# Patient Record
Sex: MALE | URBAN - METROPOLITAN AREA
[De-identification: names, ages, dates, MRNs, and addresses within clinical notes are randomized per-mention and may not be internally consistent; named-entity substitution may affect disease eponyms.]

---

## 2020-01-24 ENCOUNTER — INPATIENT (INPATIENT)
Facility: HOSPITAL | Age: 50
LOS: 7 days | Discharge: HOME HEALTH | DRG: 519 | End: 2020-02-01
Attending: NEUROLOGICAL SURGERY | Admitting: NEUROLOGICAL SURGERY
Payer: COMMERCIAL

## 2020-01-24 VITALS
TEMPERATURE: 98 F | HEIGHT: 72 IN | HEART RATE: 90 BPM | SYSTOLIC BLOOD PRESSURE: 216 MMHG | OXYGEN SATURATION: 98 % | RESPIRATION RATE: 19 BRPM | DIASTOLIC BLOOD PRESSURE: 113 MMHG | WEIGHT: 315 LBS

## 2020-01-24 LAB
ALBUMIN SERPL ELPH-MCNC: 3.8 G/DL — SIGNIFICANT CHANGE UP (ref 3.3–5)
ALP SERPL-CCNC: 97 U/L — SIGNIFICANT CHANGE UP (ref 40–120)
ALT FLD-CCNC: 19 U/L — SIGNIFICANT CHANGE UP (ref 10–45)
ANION GAP SERPL CALC-SCNC: 12 MMOL/L — SIGNIFICANT CHANGE UP (ref 5–17)
APTT BLD: 32.9 SEC — SIGNIFICANT CHANGE UP (ref 27.5–36.3)
APTT BLD: 34.3 SEC — SIGNIFICANT CHANGE UP (ref 27.5–36.3)
AST SERPL-CCNC: 22 U/L — SIGNIFICANT CHANGE UP (ref 10–40)
BASOPHILS # BLD AUTO: 0.07 K/UL — SIGNIFICANT CHANGE UP (ref 0–0.2)
BASOPHILS NFR BLD AUTO: 0.6 % — SIGNIFICANT CHANGE UP (ref 0–2)
BILIRUB SERPL-MCNC: 0.3 MG/DL — SIGNIFICANT CHANGE UP (ref 0.2–1.2)
BLD GP AB SCN SERPL QL: NEGATIVE — SIGNIFICANT CHANGE UP
BLD GP AB SCN SERPL QL: NEGATIVE — SIGNIFICANT CHANGE UP
BUN SERPL-MCNC: 24 MG/DL — HIGH (ref 7–23)
CALCIUM SERPL-MCNC: 9 MG/DL — SIGNIFICANT CHANGE UP (ref 8.4–10.5)
CHLORIDE SERPL-SCNC: 101 MMOL/L — SIGNIFICANT CHANGE UP (ref 96–108)
CO2 SERPL-SCNC: 24 MMOL/L — SIGNIFICANT CHANGE UP (ref 22–31)
CREAT SERPL-MCNC: 1.45 MG/DL — HIGH (ref 0.5–1.3)
EOSINOPHIL # BLD AUTO: 0.43 K/UL — SIGNIFICANT CHANGE UP (ref 0–0.5)
EOSINOPHIL NFR BLD AUTO: 3.5 % — SIGNIFICANT CHANGE UP (ref 0–6)
GLUCOSE BLDC GLUCOMTR-MCNC: 154 MG/DL — HIGH (ref 70–99)
GLUCOSE SERPL-MCNC: 177 MG/DL — HIGH (ref 70–99)
HCT VFR BLD CALC: 38.2 % — LOW (ref 39–50)
HGB BLD-MCNC: 12.3 G/DL — LOW (ref 13–17)
IMM GRANULOCYTES NFR BLD AUTO: 0.5 % — SIGNIFICANT CHANGE UP (ref 0–1.5)
INR BLD: 1 — SIGNIFICANT CHANGE UP (ref 0.88–1.16)
INR BLD: 1.17 — HIGH (ref 0.88–1.16)
LYMPHOCYTES # BLD AUTO: 1.9 K/UL — SIGNIFICANT CHANGE UP (ref 1–3.3)
LYMPHOCYTES # BLD AUTO: 15.6 % — SIGNIFICANT CHANGE UP (ref 13–44)
MCHC RBC-ENTMCNC: 26.9 PG — LOW (ref 27–34)
MCHC RBC-ENTMCNC: 32.2 GM/DL — SIGNIFICANT CHANGE UP (ref 32–36)
MCV RBC AUTO: 83.4 FL — SIGNIFICANT CHANGE UP (ref 80–100)
MONOCYTES # BLD AUTO: 0.81 K/UL — SIGNIFICANT CHANGE UP (ref 0–0.9)
MONOCYTES NFR BLD AUTO: 6.7 % — SIGNIFICANT CHANGE UP (ref 2–14)
NEUTROPHILS # BLD AUTO: 8.89 K/UL — HIGH (ref 1.8–7.4)
NEUTROPHILS NFR BLD AUTO: 73.1 % — SIGNIFICANT CHANGE UP (ref 43–77)
NRBC # BLD: 0 /100 WBCS — SIGNIFICANT CHANGE UP (ref 0–0)
PLATELET # BLD AUTO: 221 K/UL — SIGNIFICANT CHANGE UP (ref 150–400)
POTASSIUM SERPL-MCNC: 3.7 MMOL/L — SIGNIFICANT CHANGE UP (ref 3.5–5.3)
POTASSIUM SERPL-SCNC: 3.7 MMOL/L — SIGNIFICANT CHANGE UP (ref 3.5–5.3)
PROT SERPL-MCNC: 7.2 G/DL — SIGNIFICANT CHANGE UP (ref 6–8.3)
PROTHROM AB SERPL-ACNC: 11.4 SEC — SIGNIFICANT CHANGE UP (ref 10–12.9)
PROTHROM AB SERPL-ACNC: 13.4 SEC — HIGH (ref 10–12.9)
RBC # BLD: 4.58 M/UL — SIGNIFICANT CHANGE UP (ref 4.2–5.8)
RBC # FLD: 14.1 % — SIGNIFICANT CHANGE UP (ref 10.3–14.5)
RH IG SCN BLD-IMP: NEGATIVE — SIGNIFICANT CHANGE UP
RH IG SCN BLD-IMP: NEGATIVE — SIGNIFICANT CHANGE UP
SODIUM SERPL-SCNC: 137 MMOL/L — SIGNIFICANT CHANGE UP (ref 135–145)
WBC # BLD: 12.16 K/UL — HIGH (ref 3.8–10.5)
WBC # FLD AUTO: 12.16 K/UL — HIGH (ref 3.8–10.5)

## 2020-01-24 PROCEDURE — 72146 MRI CHEST SPINE W/O DYE: CPT | Mod: 26

## 2020-01-24 PROCEDURE — 99282 EMERGENCY DEPT VISIT SF MDM: CPT

## 2020-01-24 PROCEDURE — 72141 MRI NECK SPINE W/O DYE: CPT | Mod: 26

## 2020-01-24 PROCEDURE — 99285 EMERGENCY DEPT VISIT HI MDM: CPT

## 2020-01-24 PROCEDURE — 99223 1ST HOSP IP/OBS HIGH 75: CPT | Mod: 57

## 2020-01-24 PROCEDURE — 72148 MRI LUMBAR SPINE W/O DYE: CPT | Mod: 26

## 2020-01-24 RX ORDER — ENOXAPARIN SODIUM 100 MG/ML
40 INJECTION SUBCUTANEOUS EVERY 12 HOURS
Refills: 0 | Status: DISCONTINUED | OUTPATIENT
Start: 2020-01-24 | End: 2020-01-25

## 2020-01-24 RX ORDER — DEXAMETHASONE 0.5 MG/5ML
4 ELIXIR ORAL EVERY 6 HOURS
Refills: 0 | Status: DISCONTINUED | OUTPATIENT
Start: 2020-01-24 | End: 2020-01-26

## 2020-01-24 RX ORDER — SENNA PLUS 8.6 MG/1
2 TABLET ORAL AT BEDTIME
Refills: 0 | Status: DISCONTINUED | OUTPATIENT
Start: 2020-01-24 | End: 2020-02-01

## 2020-01-24 RX ORDER — KETOROLAC TROMETHAMINE 30 MG/ML
30 SYRINGE (ML) INJECTION ONCE
Refills: 0 | Status: DISCONTINUED | OUTPATIENT
Start: 2020-01-24 | End: 2020-01-24

## 2020-01-24 RX ORDER — INSULIN LISPRO 100/ML
VIAL (ML) SUBCUTANEOUS
Refills: 0 | Status: DISCONTINUED | OUTPATIENT
Start: 2020-01-24 | End: 2020-02-01

## 2020-01-24 RX ORDER — DEXTROSE 50 % IN WATER 50 %
15 SYRINGE (ML) INTRAVENOUS ONCE
Refills: 0 | Status: DISCONTINUED | OUTPATIENT
Start: 2020-01-24 | End: 2020-02-01

## 2020-01-24 RX ORDER — SODIUM CHLORIDE 9 MG/ML
1000 INJECTION, SOLUTION INTRAVENOUS
Refills: 0 | Status: DISCONTINUED | OUTPATIENT
Start: 2020-01-24 | End: 2020-02-01

## 2020-01-24 RX ORDER — METOPROLOL TARTRATE 50 MG
5 TABLET ORAL ONCE
Refills: 0 | Status: COMPLETED | OUTPATIENT
Start: 2020-01-24 | End: 2020-01-24

## 2020-01-24 RX ORDER — ENOXAPARIN SODIUM 100 MG/ML
40 INJECTION SUBCUTANEOUS AT BEDTIME
Refills: 0 | Status: DISCONTINUED | OUTPATIENT
Start: 2020-01-24 | End: 2020-01-24

## 2020-01-24 RX ORDER — ACETAMINOPHEN 500 MG
650 TABLET ORAL EVERY 6 HOURS
Refills: 0 | Status: DISCONTINUED | OUTPATIENT
Start: 2020-01-24 | End: 2020-02-01

## 2020-01-24 RX ORDER — GLUCAGON INJECTION, SOLUTION 0.5 MG/.1ML
1 INJECTION, SOLUTION SUBCUTANEOUS ONCE
Refills: 0 | Status: DISCONTINUED | OUTPATIENT
Start: 2020-01-24 | End: 2020-02-01

## 2020-01-24 RX ORDER — OXYCODONE AND ACETAMINOPHEN 5; 325 MG/1; MG/1
1 TABLET ORAL ONCE
Refills: 0 | Status: DISCONTINUED | OUTPATIENT
Start: 2020-01-24 | End: 2020-01-24

## 2020-01-24 RX ORDER — AMLODIPINE BESYLATE 2.5 MG/1
10 TABLET ORAL ONCE
Refills: 0 | Status: COMPLETED | OUTPATIENT
Start: 2020-01-24 | End: 2020-01-24

## 2020-01-24 RX ORDER — PANTOPRAZOLE SODIUM 20 MG/1
40 TABLET, DELAYED RELEASE ORAL
Refills: 0 | Status: DISCONTINUED | OUTPATIENT
Start: 2020-01-24 | End: 2020-02-01

## 2020-01-24 RX ORDER — AMLODIPINE BESYLATE 2.5 MG/1
10 TABLET ORAL DAILY
Refills: 0 | Status: DISCONTINUED | OUTPATIENT
Start: 2020-01-24 | End: 2020-02-01

## 2020-01-24 RX ORDER — DEXTROSE 50 % IN WATER 50 %
12.5 SYRINGE (ML) INTRAVENOUS ONCE
Refills: 0 | Status: DISCONTINUED | OUTPATIENT
Start: 2020-01-24 | End: 2020-02-01

## 2020-01-24 RX ADMIN — OXYCODONE AND ACETAMINOPHEN 1 TABLET(S): 5; 325 TABLET ORAL at 16:47

## 2020-01-24 RX ADMIN — OXYCODONE AND ACETAMINOPHEN 1 TABLET(S): 5; 325 TABLET ORAL at 17:12

## 2020-01-24 RX ADMIN — Medication 2: at 23:06

## 2020-01-24 RX ADMIN — Medication 5 MILLIGRAM(S): at 23:22

## 2020-01-24 RX ADMIN — Medication 30 MILLIGRAM(S): at 16:47

## 2020-01-24 RX ADMIN — OXYCODONE AND ACETAMINOPHEN 1 TABLET(S): 5; 325 TABLET ORAL at 03:11

## 2020-01-24 RX ADMIN — Medication 30 MILLIGRAM(S): at 17:12

## 2020-01-24 RX ADMIN — Medication 4 MILLIGRAM(S): at 23:21

## 2020-01-24 RX ADMIN — AMLODIPINE BESYLATE 10 MILLIGRAM(S): 2.5 TABLET ORAL at 03:11

## 2020-01-24 RX ADMIN — AMLODIPINE BESYLATE 10 MILLIGRAM(S): 2.5 TABLET ORAL at 19:20

## 2020-01-24 NOTE — H&P ADULT - ATTENDING COMMENTS
Patient seen and examined 1/24/20. He presents to the ED after his legs gave out. He indicates that he has been having progressive lower extremity weakness for approximately 1 month. He has noticed numbness and paresthesias in the legs as well. No bowel/bladder issues. He indicates that he is unable to walk at this time. Antigravity in lower extremities but subjectively feels week per patient. Pitting lower extremity edema noted. Morbid obesity noted. MRI T-spine significant for T11-12 spinal canal stenosis with cord compression and signal from facet and ligament hypertrophy as well as disc disease; spinal cord signal change noted at that level. Plan for medical/cardiac clearance then T11-T12 laminectomy for spinal cord decompression. Risks including but not limited to infection, spinal destabilization, need for reoperation, weakness, paralysis, failure to improve neurologically, CSF leak discussed. Patient understands and wishes to move ahead with surgery. Will need Echo, LE doppler, CT T and L spine as part of preoperative workup. Start steroids.    Rodrick Mcgregor M.D.

## 2020-01-24 NOTE — ED ADULT NURSE REASSESSMENT NOTE - NS ED NURSE REASSESS COMMENT FT1
Patient received at change of shift resting in stretcher, no acute distress.  Respirations unlabored, non-diaphoretic, no pallor.  Patient brought for MRI, will monitor.

## 2020-01-24 NOTE — ED PROVIDER NOTE - ATTENDING CONTRIBUTION TO CARE
49M hx htn, c/o legs giving out. pt states has had lower back pain for past month. states tonight while a work his legs gave out on him and he fell to the ground. no incontinence. no saddle anesthesia. no trauma. no vomiting, no fevers.   gen- nad  heent- ncat, clear conj  cv -rrr  lungs -ctab  abd - soft, nt, nd  ext -wwp  neuro -aox3, castro, sensation intact, decreased strength b/l lower extremities  pt seen by neurosurgery, pending MRI to r/o cord process.

## 2020-01-24 NOTE — PATIENT PROFILE ADULT - DO YOU FEEL UNSAFE AT SCHOOL?
Patient's BP has dropped substantially, possibly aided by starting a low sodium diet. She is at 108/65 today in our visit, had an episode of <100/50 over the weekend, although she denies hypotensive symptoms. She stopped Amlodipine and cut her Carvedilol dose in half last Saturday per direction of on call MD. We may want to consider further dose reductions depending on where she levels out at. If she does need to continue BP medications she much prefers once daily formulations such as Amlodipine as they are easier to take. Let me know if you want to make any changes before I see her again next month. Thanks!  Brenden Blackwell, PharmD [facility] Pharmacist  Phone: [phone]  not applicable

## 2020-01-24 NOTE — H&P ADULT - NSHPPHYSICALEXAM_GEN_ALL_CORE
GEN: pt laying in bed, appears well, NAD  NEURO: AOx3. FC, OE spont, speech clear, face symmetric. CNII-XII intact, PERRL, EOMI. No drift. MAEx4. b/l UE 5/5 strength. b/l LE 3/5 strength. SILT.   CV: RRR +S1/S2  PULM: CTAB  GI: Abd obese, NT/ND  EXT: Ext warm, nontender. +pitting edema b/l.

## 2020-01-24 NOTE — ED ADULT NURSE REASSESSMENT NOTE - NS ED NURSE REASSESS COMMENT FT1
Patient a/oX 3, no new pain or symptom complaint, no new neuro deficits, vital signs stable.  MRI resulted;  Neurosurgery consult pending.

## 2020-01-24 NOTE — CONSULT NOTE ADULT - SUBJECTIVE AND OBJECTIVE BOX
Pt is 50 yo male PMH: HTN, BIBEMS to ED [facility] s/p both leg "giving out/buckling" yesterday, fall, no LOC.  Pt reports lower back pain x 1 month, radiating down b/l LE.  Pt denies acute bowel/bladder dysfunction, saddle paresthesia, foot drop, fevers/chills.    ICU Vital Signs Last 24 Hrs  T(C): 36.7 (24 Jan 2020 04:27), Max: 36.8 (24 Jan 2020 01:04)  T(F): 98.1 (24 Jan 2020 04:27), Max: 98.3 (24 Jan 2020 01:04)  HR: 87 (24 Jan 2020 04:27) (87 - 90)  BP: 211/101 (24 Jan 2020 04:27) (211/101 - 216/113)  BP(mean): --  ABP: --  ABP(mean): --  RR: 18 (24 Jan 2020 04:27) (18 - 19)  SpO2: 96% (24 Jan 2020 04:27) (96% - 98%)    Exam:  AA&OX3, NAD, clear coherent speech  CNs II-XII grossly intact  motor 5/5 b/l UE, negative Berg,   b/l LE 3-4/5, EHL b/l 5/5, negative for clonus,   hyporeflexic throughout  sensation to LT grossly intact throughout  rectal tone intact  no point tenderness to palpation throughout the entire spine    A/R  Pt is 50 yo male PMH: HTN, BIBEMS to ED [facility] s/p both leg "giving out/buckling" yesterday, fall, no LOC  - MRI C-T-L spine w/o contr  - Will follow up with finals recs upon completion of MRI of the spine  - D/w Dr. Marcano

## 2020-01-24 NOTE — H&P ADULT - NSHPREVIEWOFSYSTEMS_GEN_ALL_CORE
REVIEW OF SYSTEMS      General:	no recent illnesses, no recent wt gain/loss    Skin/Breast:  intact  	  Ophthalmologic:  negative, glasses for distance  	  ENMT:	negative    Respiratory and Thorax: no coughing, wheezing, recent URI  	  Cardiovascular: no chest pain, FERREIRA    Gastrointestinal:	soft, non tender    Genitourinary: no frequency, dysuria    Musculoskeletal:	negative    Neurological:	see HPI    Psychiatric:	negative    Hematology/Lymphatics:	negative    Endocrine:  	negative    Allergic/Immunologic:  Negative

## 2020-01-24 NOTE — H&P ADULT - NSHPLABSRESULTS_GEN_ALL_CORE
MRI total spine 1/24/20:    MR thoracicspine:    T11-12 cord compression and cord signal abnormality due to disc bulge with prominent ligamentum flavum hypertrophy.    T2-3 disc bulge with ligamentum flavum hypertrophy resulting in moderate spinal canal stenosis. No cord signal abnormality.    Additional multilevel degenerative changes of thoracic spine resulting in spinal canal stenosis and neuroforaminal narrowing as described above.    No acute fracture.    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    No acute fracture.

## 2020-01-24 NOTE — ED PROVIDER NOTE - CLINICAL SUMMARY MEDICAL DECISION MAKING FREE TEXT BOX
48 yo m with BLE weakness; hx of low back pain but weakness out of proportion to pain even after tx of pain.  Plan: MRI, neurosurg consult.
Current some day smoker

## 2020-01-24 NOTE — ED PROVIDER NOTE - PHYSICAL EXAMINATION
CONSTITUTIONAL: WD,WN. NAD.    SKIN: Normal color and turgor. No rash.    HEAD: NC/AT.  EYES: Conjunctiva clear. EOMI. PERRL.    ENT: Airway patent, OP without erythema, tonsillar swelling or exudate; uvula midline without swelling. Nasal mucosa clear, no rhinorrhea.   RESPIRATORY:  Breathing non-labored. No retractions or accessory muscle use.  Lungs CTA bilat.  CARDIOVASCULAR:  RRR, S1S2. No M/R/G.      GI:  Abdomen soft, nontender.    MSK: Neck supple with painless ROM.  No lower extremity edema or calf tenderness.  No joint swelling or ROM limitation.  NEURO: Alert and oriented; CN II-XII grossly intact. Speech clear. 3/5 hip flexion, 5/5 ankle & EHL dorsiflexion, 5/5 plantarflexion. Babinsky equivocal.  Decr sensation to light touch and poor proprioception bilat.  Decr pinprick lower legs.  Trace patellar DTR bilat. Able to stand with difficulty but unable to walk. Perianal sensation and rectal tone intact.

## 2020-01-24 NOTE — ED PROVIDER NOTE - OBJECTIVE STATEMENT
48 yo m PMHx HTN  lumbar pain/spasm for past month; yesterday fell twice at work due to legs buckling; reports paresthesia in lower legs but no saddle anesthesia, no upper ext weaknss/paresthesia, bladder or bowel dysfunction.

## 2020-01-24 NOTE — H&P ADULT - HISTORY OF PRESENT ILLNESS
Pt is a 48 y/o M PMH HTN presents after fall and leg weakness x 1 day. Pt has had intermittent leg weakness for the past few weeks, but yesterday his knees "buckled" causing him to fall. The same thing happened to him again when he tried to stand up. He reports feeling "muscle spasm" in low back and intermittent numbness in b/l LEs. Pt denies fever, HA, vision changes, paresthesias, N/V, urinary/bowel incontinence, saddle anesthesia. Pt takes norvasc for HTN.

## 2020-01-24 NOTE — H&P ADULT - ASSESSMENT
Pt is a 48 y/o M PMH HTN presented after fall and LBP, found to have T11-12, T2-3 disc bulge with cord compression on MRI    Plan:  - Admit to regional  - pre op for Lumbar decompression on 1/26/20 with Dr. Mcgregor  - neuro/vitals checks  - pain control  - pre op workup (echo, EKG, CXR, T/S, coags)  - Needs Dr. Blood clearance  - dopplers ordered  - cont norvasc 10  - decadron 4q6    Above plan d/w Dr. Mcgregor

## 2020-01-24 NOTE — ED ADULT NURSE NOTE - OBJECTIVE STATEMENT
50 y/o female received into the ED, 50 y/o female received into the ED, complaining of weakness and swelling to both lower legs.  Pt. states that he fell twice yesterday and hurt his knees.  Both of his lower legs appear swollen, red in color and painful to touch.  Pt. denies recent travel or injuries to the legs.

## 2020-01-24 NOTE — ED ADULT NURSE REASSESSMENT NOTE - NS ED NURSE REASSESS COMMENT FT1
Patient care rececived from previous RN. Patient a/oX 3, c/o of bilateral lower leg weakness , no pain or other neuro deficits.  Vital signs stable.  MRI results pelnding.  FAll precaution observed.

## 2020-01-24 NOTE — ED PROVIDER NOTE - NS ED ROS FT
CONSTITUTIONAL: No fever, chills, or weakness  NEURO: No headache, no dizziness, no syncope;   EYES: No visual changes  ENT: No rhinorrhea or sore throat  PULM: No cough or dyspnea  CV: No chest pain or palpitations  GI: No abdominal pain, vomiting, or diarrhea  : No dysuria, hematuria, frequency  MSK: No neck pain or no joint pain  SKIN: no rash or unusual bruising

## 2020-01-24 NOTE — ED PROVIDER NOTE - PROGRESS NOTE DETAILS
Radiology has tried reaching the on-call MRI tech but no answer; she left message and will continue to try to contact them.  Neurosurg PA evaluating patient. Ree: pt received from Dr. Borges and SUZE Batista at s/o; pt with low back pain, leg weakness and numbness. Seen by nsg and awaiting MR c-t-l spine. Will continue to monitor. jose: pt received at sign out from margot masters/dr garcía - pt in ed for 7 hrs pending full spine mri for le weakness, nsg was consulted by overnight team nsg will admit to reg bed

## 2020-01-24 NOTE — ED ADULT NURSE REASSESSMENT NOTE - NS ED NURSE REASSESS COMMENT FT1
Bilateral leg pain and weakness improved s/p Toradol, Percocet, no new symptom complaint.  Vital signs stable.  For admit;  transfer to ED holding 11 pending room assignment.

## 2020-01-25 DIAGNOSIS — I10 ESSENTIAL (PRIMARY) HYPERTENSION: ICD-10-CM

## 2020-01-25 DIAGNOSIS — R29.898 OTHER SYMPTOMS AND SIGNS INVOLVING THE MUSCULOSKELETAL SYSTEM: ICD-10-CM

## 2020-01-25 DIAGNOSIS — Z01.818 ENCOUNTER FOR OTHER PREPROCEDURAL EXAMINATION: ICD-10-CM

## 2020-01-25 LAB
ANION GAP SERPL CALC-SCNC: 12 MMOL/L — SIGNIFICANT CHANGE UP (ref 5–17)
BASOPHILS # BLD AUTO: 0.06 K/UL — SIGNIFICANT CHANGE UP (ref 0–0.2)
BASOPHILS NFR BLD AUTO: 0.5 % — SIGNIFICANT CHANGE UP (ref 0–2)
CHLORIDE SERPL-SCNC: 100 MMOL/L — SIGNIFICANT CHANGE UP (ref 96–108)
CO2 SERPL-SCNC: 23 MMOL/L — SIGNIFICANT CHANGE UP (ref 22–31)
EOSINOPHIL # BLD AUTO: 0.01 K/UL — SIGNIFICANT CHANGE UP (ref 0–0.5)
EOSINOPHIL NFR BLD AUTO: 0.1 % — SIGNIFICANT CHANGE UP (ref 0–6)
GLUCOSE BLDC GLUCOMTR-MCNC: 170 MG/DL — HIGH (ref 70–99)
GLUCOSE BLDC GLUCOMTR-MCNC: 191 MG/DL — HIGH (ref 70–99)
GLUCOSE BLDC GLUCOMTR-MCNC: 202 MG/DL — HIGH (ref 70–99)
HBA1C BLD-MCNC: 7.7 % — HIGH (ref 4–5.6)
HCT VFR BLD CALC: 41.1 % — SIGNIFICANT CHANGE UP (ref 39–50)
HGB BLD-MCNC: 12.9 G/DL — LOW (ref 13–17)
IMM GRANULOCYTES NFR BLD AUTO: 0.3 % — SIGNIFICANT CHANGE UP (ref 0–1.5)
LYMPHOCYTES # BLD AUTO: 0.94 K/UL — LOW (ref 1–3.3)
LYMPHOCYTES # BLD AUTO: 8.1 % — LOW (ref 13–44)
MAGNESIUM SERPL-MCNC: 1.9 MG/DL — SIGNIFICANT CHANGE UP (ref 1.6–2.6)
MCHC RBC-ENTMCNC: 26.4 PG — LOW (ref 27–34)
MCHC RBC-ENTMCNC: 31.4 GM/DL — LOW (ref 32–36)
MCV RBC AUTO: 84.2 FL — SIGNIFICANT CHANGE UP (ref 80–100)
MONOCYTES # BLD AUTO: 0.21 K/UL — SIGNIFICANT CHANGE UP (ref 0–0.9)
MONOCYTES NFR BLD AUTO: 1.8 % — LOW (ref 2–14)
NEUTROPHILS # BLD AUTO: 10.36 K/UL — HIGH (ref 1.8–7.4)
NEUTROPHILS NFR BLD AUTO: 89.2 % — HIGH (ref 43–77)
NRBC # BLD: 0 /100 WBCS — SIGNIFICANT CHANGE UP (ref 0–0)
PLATELET # BLD AUTO: 231 K/UL — SIGNIFICANT CHANGE UP (ref 150–400)
POTASSIUM SERPL-MCNC: 4.1 MMOL/L — SIGNIFICANT CHANGE UP (ref 3.5–5.3)
POTASSIUM SERPL-SCNC: 4.1 MMOL/L — SIGNIFICANT CHANGE UP (ref 3.5–5.3)
RBC # BLD: 4.88 M/UL — SIGNIFICANT CHANGE UP (ref 4.2–5.8)
RBC # FLD: 13.8 % — SIGNIFICANT CHANGE UP (ref 10.3–14.5)
SODIUM SERPL-SCNC: 135 MMOL/L — SIGNIFICANT CHANGE UP (ref 135–145)
WBC # BLD: 11.62 K/UL — HIGH (ref 3.8–10.5)
WBC # FLD AUTO: 11.62 K/UL — HIGH (ref 3.8–10.5)

## 2020-01-25 PROCEDURE — 72128 CT CHEST SPINE W/O DYE: CPT | Mod: 26

## 2020-01-25 PROCEDURE — 71045 X-RAY EXAM CHEST 1 VIEW: CPT | Mod: 26

## 2020-01-25 PROCEDURE — 93306 TTE W/DOPPLER COMPLETE: CPT | Mod: 26

## 2020-01-25 PROCEDURE — 72131 CT LUMBAR SPINE W/O DYE: CPT | Mod: 26

## 2020-01-25 PROCEDURE — 99221 1ST HOSP IP/OBS SF/LOW 40: CPT

## 2020-01-25 RX ORDER — SODIUM CHLORIDE 9 MG/ML
1000 INJECTION INTRAMUSCULAR; INTRAVENOUS; SUBCUTANEOUS
Refills: 0 | Status: DISCONTINUED | OUTPATIENT
Start: 2020-01-25 | End: 2020-01-25

## 2020-01-25 RX ORDER — SODIUM CHLORIDE 9 MG/ML
1000 INJECTION INTRAMUSCULAR; INTRAVENOUS; SUBCUTANEOUS
Refills: 0 | Status: DISCONTINUED | OUTPATIENT
Start: 2020-01-25 | End: 2020-01-26

## 2020-01-25 RX ORDER — POVIDONE-IODINE 5 %
1 AEROSOL (ML) TOPICAL ONCE
Refills: 0 | Status: COMPLETED | OUTPATIENT
Start: 2020-01-26 | End: 2020-01-26

## 2020-01-25 RX ADMIN — PANTOPRAZOLE SODIUM 40 MILLIGRAM(S): 20 TABLET, DELAYED RELEASE ORAL at 06:20

## 2020-01-25 RX ADMIN — AMLODIPINE BESYLATE 10 MILLIGRAM(S): 2.5 TABLET ORAL at 06:20

## 2020-01-25 RX ADMIN — Medication 4 MILLIGRAM(S): at 06:20

## 2020-01-25 RX ADMIN — Medication 2: at 07:26

## 2020-01-25 RX ADMIN — ENOXAPARIN SODIUM 40 MILLIGRAM(S): 100 INJECTION SUBCUTANEOUS at 19:40

## 2020-01-25 RX ADMIN — Medication 4 MILLIGRAM(S): at 19:40

## 2020-01-25 RX ADMIN — ENOXAPARIN SODIUM 40 MILLIGRAM(S): 100 INJECTION SUBCUTANEOUS at 06:20

## 2020-01-25 RX ADMIN — Medication 2: at 22:27

## 2020-01-25 RX ADMIN — Medication 4: at 18:04

## 2020-01-25 RX ADMIN — Medication 4 MILLIGRAM(S): at 23:27

## 2020-01-25 RX ADMIN — Medication 4 MILLIGRAM(S): at 15:07

## 2020-01-25 NOTE — PROGRESS NOTE ADULT - SUBJECTIVE AND OBJECTIVE BOX
HPI:  Pt is a 48 y/o M PMH HTN presents after fall and leg weakness x 1 day. Pt has had intermittent leg weakness for the past few weeks, but yesterday his knees "buckled" causing him to fall. The same thing happened to him again when he tried to stand up. He reports feeling "muscle spasm" in low back and intermittent numbness in b/l LEs. Pt denies fever, HA, vision changes, paresthesias, N/V, urinary/bowel incontinence, saddle anesthesia. Pt takes norvasc for HTN. (24 Jan 2020 19:16)  HOSPITAL COURSE:  1/24: admitted from ED with thoracic cord compression.  1/25: Ct scan performed, NPO for Echocardiogram.   OVERNIGHT EVENTS: no major events overnight  Vital Signs Last 24 Hrs  T(C): 36.9 (24 Jan 2020 21:29), Max: 37.2 (24 Jan 2020 19:23)  T(F): 98.5 (24 Jan 2020 21:29), Max: 98.9 (24 Jan 2020 19:23)  HR: 82 (25 Jan 2020 00:30) (78 - 96)  BP: 159/84 (25 Jan 2020 00:30) (159/84 - 211/101)  BP(mean): --  RR: 20 (25 Jan 2020 00:30) (17 - 20)  SpO2: 97% (25 Jan 2020 00:30) (95% - 98%)    I&O's Summary      PHYSICAL EXAM:    GEN: pt laying in bed, appears well, NAD  	NEURO: AOx3. FC, OE spont, speech clear, face symmetric. CNII-XII intact, PERRL, EOMI. No drift. MAEx4. b/l UE 5/5 strength. b/l LE 3/5 strength. SILT.   	CV: RRR +S1/S2  	PULM: CTAB  	GI: Abd obese, NT/ND  EXT: Ext warm, nontender. +pitting edema b/l      DIET: regular  [] NPO  [] Mechanical  [] Tube feeds    LABS:                        12.3   12.16 )-----------( 221      ( 24 Jan 2020 05:33 )             38.2     01-24    137  |  101  |  24<H>  ----------------------------<  177<H>  3.7   |  24  |  1.45<H>    Ca    9.0      24 Jan 2020 05:33    TPro  7.2  /  Alb  3.8  /  TBili  0.3  /  DBili  x   /  AST  22  /  ALT  19  /  AlkPhos  97  01-24    PT/INR - ( 24 Jan 2020 20:28 )   PT: 13.4 sec;   INR: 1.17          PTT - ( 24 Jan 2020 20:28 )  PTT:32.9 sec        CAPILLARY BLOOD GLUCOSE      POCT Blood Glucose.: 154 mg/dL (24 Jan 2020 22:24)      Drug Levels: [] N/A    CSF Analysis: [] N/A      Allergies    No Known Allergies    Intolerances      MEDICATIONS:  Antibiotics:    Neuro:  acetaminophen   Tablet .. 650 milliGRAM(s) Oral every 6 hours PRN    Anticoagulation:  enoxaparin Injectable 40 milliGRAM(s) SubCutaneous every 12 hours    OTHER:  amLODIPine   Tablet 10 milliGRAM(s) Oral daily  dexAMETHasone     Tablet 4 milliGRAM(s) Oral every 6 hours  dextrose 40% Gel 15 Gram(s) Oral once PRN  dextrose 50% Injectable 12.5 Gram(s) IV Push once  glucagon  Injectable 1 milliGRAM(s) IntraMuscular once PRN  insulin lispro (HumaLOG) corrective regimen sliding scale   SubCutaneous Before meals and at bedtime  pantoprazole    Tablet 40 milliGRAM(s) Oral before breakfast  senna 2 Tablet(s) Oral at bedtime PRN    IVF:  dextrose 5%. 1000 milliLiter(s) IV Continuous <Continuous>    CULTURES:    RADIOLOGY & ADDITIONAL TESTS:      ASSESSMENT:  49y Male with PMH HTN presented after fall and LBP, found to have T11-12, T2-3 disc bulge with cord compression on MRI      LEG WEAKNESS BILATERAL  No pertinent family history in first degree relatives  Handoff  MEWS Score  HTN (hypertension)  Leg weakness, bilateral  No significant past surgical history  WEAKNESS      PLAN:   Medicine / cardiology clearance  -echo  -LE doppler  -Pre op for thoracic decompression on Sunday pr Dr. Mcgregor  dw with Dr. Mcgregor  DVT PROPHYLAXIS:  [x] Venodynes                                [x Heparin/Lovenox    DISPOSITION: pending    Assessment:  Present when checked    []  GCS  E   V  M     Heart Failure: []Acute, [] acute on chronic , []chronic  Heart Failure:  [] Diastolic (HFpEF), [] Systolic (HFrEF), []Combined (HFpEF and HFrEF), [] RHF, [] Pulm HTN, [] Other    [] [place], [] ATN, [] AIN, [] other  [] CKD1, [] CKD2, [] CKD 3, [] CKD 4, [] CKD 5, []ESRD    Encephalopathy: [] Metabolic, [] Hepatic, [] toxic, [] Neurological, [] Other    Abnormal Nurtitional Status: [] malnurtition (see nutrition note), [ ]underweight: BMI < 19, [] morbid obesity: BMI >40, [] Cachexia    [] Sepsis  [] hypovolemic shock,[] cardiogenic shock, [] hemorrhagic shock, [] neuogenic shock  [] Acute Respiratory Failure  []Cerebral edema, [] Brain compression/ herniation,   [] Functional quadriplegia  [] Acute blood loss anemia

## 2020-01-25 NOTE — CONSULT NOTE ADULT - SUBJECTIVE AND OBJECTIVE BOX
Patient is a 49y old  Male who presents with a chief complaint of Cord Compression (25 Jan 2020 03:39)      HPI:  Pt is a 48 y/o M PMH HTN presents after fall and leg weakness x 1 day. Pt has had intermittent leg weakness for the past few weeks, but yesterday his knees "buckled" causing him to fall. The same thing happened to him again when he tried to stand up. He reports feeling "muscle spasm" in low back and intermittent numbness in b/l LEs. Pt denies fever, HA, vision changes, paresthesias, N/V, urinary/bowel incontinence, saddle anesthesia. Pt takes norvasc for HTN. (24 Jan 2020 19:16)      PAST MEDICAL & SURGICAL HISTORY:  HTN (hypertension)  No significant past surgical history      FAMILY HISTORY:  No pertinent family history in first degree relatives      SOCIAL HISTORY:  Smoking Status: [ ] Current, [ ] Former, [ ] Never  Pack Years:    MEDICATIONS:  Pulmonary:    Antimicrobials:    Anticoagulants:  enoxaparin Injectable 40 milliGRAM(s) SubCutaneous every 12 hours    Onc:    GI/:  pantoprazole    Tablet 40 milliGRAM(s) Oral before breakfast  senna 2 Tablet(s) Oral at bedtime PRN    Endocrine:  dexAMETHasone     Tablet 4 milliGRAM(s) Oral every 6 hours  dextrose 40% Gel 15 Gram(s) Oral once PRN  dextrose 50% Injectable 12.5 Gram(s) IV Push once  glucagon  Injectable 1 milliGRAM(s) IntraMuscular once PRN  insulin lispro (HumaLOG) corrective regimen sliding scale   SubCutaneous Before meals and at bedtime    Cardiac:  amLODIPine   Tablet 10 milliGRAM(s) Oral daily    Other Medications:  acetaminophen   Tablet .. 650 milliGRAM(s) Oral every 6 hours PRN  dextrose 5%. 1000 milliLiter(s) IV Continuous <Continuous>  sodium chloride 0.9%. 1000 milliLiter(s) IV Continuous <Continuous>      Allergies    No Known Allergies    Intolerances        Review of Systems:   •	General: negative  •	Skin/Breast: negative  •	Ophthalmologic: negative  •	ENMT: negative  •	Respiratory and Thorax: negative  •	Cardiovascular: negative  •	Gastrointestinal: negative  •	Genitourinary: negative  •	Musculoskeletal: negative  •	Neurological: negative  •	Psychiatric: negative  •	Hematology/Lymphatics: negative  •	Endocrine: negative  •	Allergic/Immunologic: negative    Physical Exam:   • Constitutional:	Well-developed, well nourished  • Eyes:	EOMI; PERRL; no drainage or redness  • ENMT:	No oral lesions; no gross abnormalities  • Neck	No bruits; no thyromegaly or nodules  • Breasts:	not examined  • Back:	No deformity or limitation of movement  • Respiratory:	Breath Sounds equal & clear to percussion & auscultation, no accessory muscle use  • Cardiovascular:	Regular rate & rhythm, normal S1, S2; no murmurs, gallops or rubs; no S3, S4  • Gastrointestinal:	Soft, non-tender, no hepatosplenomegaly, normal bowel sounds  • Genitourinary:	not examined  • Rectal: not examined  • Extremities:	No cyanosis, clubbing or edema  • Vascular:	Equal and normal pulses (carotid, femoral, dorsalis pedis)  • Neurologica:l	not examined  • Skin:	No lesions; no rash  • Lymph Nodes:	No lymphadedenopathy  • Musculoskeletal:	No joint pain, swelling or deformity; no limitation of movement      Vital Signs Last 24 Hrs  T(C): 37 (25 Jan 2020 05:32), Max: 37.2 (24 Jan 2020 19:23)  T(F): 98.6 (25 Jan 2020 05:32), Max: 98.9 (24 Jan 2020 19:23)  HR: 92 (25 Jan 2020 07:20) (78 - 96)  BP: 170/83 (25 Jan 2020 07:20) (159/84 - 190/107)  BP(mean): --  RR: 19 (25 Jan 2020 07:20) (17 - 20)  SpO2: 96% (25 Jan 2020 07:20) (96% - 98%)    01-24 @ 07:01  -  01-25 @ 07:00  --------------------------------------------------------  IN: 0 mL / OUT: 500 mL / NET: -500 mL          LABS:      CBC Full  -  ( 25 Jan 2020 06:25 )  WBC Count : 11.62 K/uL  RBC Count : 4.88 M/uL  Hemoglobin : 12.9 g/dL  Hematocrit : 41.1 %  Platelet Count - Automated : 231 K/uL  Mean Cell Volume : 84.2 fl  Mean Cell Hemoglobin : 26.4 pg  Mean Cell Hemoglobin Concentration : 31.4 gm/dL  Auto Neutrophil # : 10.36 K/uL  Auto Lymphocyte # : 0.94 K/uL  Auto Monocyte # : 0.21 K/uL  Auto Eosinophil # : 0.01 K/uL  Auto Basophil # : 0.06 K/uL  Auto Neutrophil % : 89.2 %  Auto Lymphocyte % : 8.1 %  Auto Monocyte % : 1.8 %  Auto Eosinophil % : 0.1 %  Auto Basophil % : 0.5 %    01-25    135  |  100  |  x   ----------------------------<  x   4.1   |  23  |  x     Ca    9.0      24 Jan 2020 05:33  Mg     1.9     01-25    TPro  7.2  /  Alb  3.8  /  TBili  0.3  /  DBili  x   /  AST  22  /  ALT  19  /  AlkPhos  97  01-24    PT/INR - ( 24 Jan 2020 20:28 )   PT: 13.4 sec;   INR: 1.17          PTT - ( 24 Jan 2020 20:28 )  PTT:32.9 sec                  RADIOLOGY & ADDITIONAL STUDIES (The following images were personally reviewed): Patient is a 49y old  Male who presents with a chief complaint of Cord Compression (2020 03:39)      HPI:  Pt is a 48 y/o M PMH HTN presents after fall and leg weakness x 1 day. Pt has had intermittent leg weakness for the past few weeks, but yesterday his knees "buckled" causing him to fall. The same thing happened to him again when he tried to stand up. He reports feeling "muscle spasm" in low back and intermittent numbness in b/l LEs. Pt denies fever, HA, vision changes, paresthesias, N/V, urinary/bowel incontinence, saddle anesthesia. Pt takes norvasc for HTN. (2020 19:16). Pt works as [employer] (formerly [employer])  for 30 years. Denies chest pain and shortness of breath with activities. No MI, CVA, DVT       PAST MEDICAL & SURGICAL HISTORY:  HTN (hypertension)  circumcision , general anesthesia, uncomplicated    FAMILY HISTORY:  father  45y/o HIV, IVDA  mother alive 76 y/o , DM2, HTN, hypothyroid      SOCIAL HISTORY:  Smoking Status: [ ] Current, [x ] Former, [ ] Never  Pack Years: half ppd for 5 years. stopped in 2020.  social ETOH, no illicit drugs    MEDICATIONS:  Pulmonary:    Antimicrobials:    Anticoagulants:  enoxaparin Injectable 40 milliGRAM(s) SubCutaneous every 12 hours    Onc:    GI/:  pantoprazole    Tablet 40 milliGRAM(s) Oral before breakfast  senna 2 Tablet(s) Oral at bedtime PRN    Endocrine:  dexAMETHasone     Tablet 4 milliGRAM(s) Oral every 6 hours  dextrose 40% Gel 15 Gram(s) Oral once PRN  dextrose 50% Injectable 12.5 Gram(s) IV Push once  glucagon  Injectable 1 milliGRAM(s) IntraMuscular once PRN  insulin lispro (HumaLOG) corrective regimen sliding scale   SubCutaneous Before meals and at bedtime    Cardiac:  amLODIPine   Tablet 10 milliGRAM(s) Oral daily    Other Medications:  acetaminophen   Tablet .. 650 milliGRAM(s) Oral every 6 hours PRN  dextrose 5%. 1000 milliLiter(s) IV Continuous <Continuous>  sodium chloride 0.9%. 1000 milliLiter(s) IV Continuous <Continuous>      Allergies    No Known Allergies    Intolerances        Review of Systems:   •	General: negative  •	Skin/Breast: negative  •	Ophthalmologic: negative  •	ENMT: negative  •	Respiratory and Thorax: negative  •	Cardiovascular: negative  •	Gastrointestinal: negative  •	Genitourinary: negative  •	Musculoskeletal: negative  •	Neurological: negative  •	Psychiatric: negative  •	Hematology/Lymphatics: negative  •	Endocrine: negative  •	Allergic/Immunologic: negative    Physical Exam:   • Constitutional:	Well-developed, well nourished  • Eyes:	EOMI; PERRL; no drainage or redness  • ENMT:	No oral lesions; no gross abnormalities  • Neck	No bruits; no thyromegaly or nodules  • Breasts:	not examined  • Back:	No deformity or limitation of movement  • Respiratory:	Breath Sounds equal & clear to auscultation, no accessory muscle use  • Cardiovascular:	Regular rate & rhythm, normal S1, S2; no murmurs, gallops or rubs; no S3, S4  • Gastrointestinal:	Soft, non-tender, no hepatosplenomegaly, normal bowel sounds  • Genitourinary:	not examined  • Rectal: not examined  • Extremities:	No cyanosis, clubbing or edema  • Vascular:	Equal and normal pulses (dorsalis pedis)  • Neurologica:l	not examined  • Skin:	No lesions; no rash  • Lymph Nodes:	No lymphadedenopathy  • Musculoskeletal:	No joint pain, swelling or deformity; no limitation of movement      Vital Signs Last 24 Hrs  T(C): 37 (2020 05:32), Max: 37.2 (2020 19:23)  T(F): 98.6 (2020 05:32), Max: 98.9 (2020 19:23)  HR: 92 (2020 07:20) (78 - 96)  BP: 170/83 (2020 07:20) (159/84 - 190/107)  BP(mean): --  RR: 19 (2020 07:20) (17 - 20)  SpO2: 96% (2020 07:20) (96% - 98%)     @ 07:01  -   @ 07:00  --------------------------------------------------------  IN: 0 mL / OUT: 500 mL / NET: -500 mL          LABS:      CBC Full  -  ( 2020 06:25 )  WBC Count : 11.62 K/uL  RBC Count : 4.88 M/uL  Hemoglobin : 12.9 g/dL  Hematocrit : 41.1 %  Platelet Count - Automated : 231 K/uL  Mean Cell Volume : 84.2 fl  Mean Cell Hemoglobin : 26.4 pg  Mean Cell Hemoglobin Concentration : 31.4 gm/dL  Auto Neutrophil # : 10.36 K/uL  Auto Lymphocyte # : 0.94 K/uL  Auto Monocyte # : 0.21 K/uL  Auto Eosinophil # : 0.01 K/uL  Auto Basophil # : 0.06 K/uL  Auto Neutrophil % : 89.2 %  Auto Lymphocyte % : 8.1 %  Auto Monocyte % : 1.8 %  Auto Eosinophil % : 0.1 %  Auto Basophil % : 0.5 %        135  |  100  |  x   ----------------------------<  x   4.1   |  23  |  x     Ca    9.0      2020 05:33  Mg     1.9         TPro  7.2  /  Alb  3.8  /  TBili  0.3  /  DBili  x   /  AST  22  /  ALT  19  /  AlkPhos  97  -24    PT/INR - ( 2020 20:28 )   PT: 13.4 sec;   INR: 1.17          PTT - ( 2020 20:28 )  PTT:32.9 sec                  RADIOLOGY & ADDITIONAL STUDIES (The following images were personally reviewed):  ECG 2020 in chart  SR 81 bpm, no ST elevation, incomplete RBBB Patient is a 49y old  Male who presents with a chief complaint of Cord Compression (2020 03:39)      HPI:  Pt is a 50 y/o M PMH HTN presents after fall and leg weakness x 1 day. Pt has had intermittent leg weakness for the past few weeks, but yesterday his knees "buckled" causing him to fall. The same thing happened to him again when he tried to stand up. He reports feeling "muscle spasm" in low back and intermittent numbness in b/l LEs. Pt denies fever, HA, vision changes, paresthesias, N/V, urinary/bowel incontinence, saddle anesthesia. Pt takes norvasc for HTN. (2020 19:16). Pt works as [email or web address]  for 30 years. Denies chest pain and shortness of breath with activities. No MI, CVA, DVT       PAST MEDICAL & SURGICAL HISTORY:  HTN (hypertension)  circumcision , general anesthesia, uncomplicated    FAMILY HISTORY:  father  43y/o HIV, IVDA  mother alive 76 y/o , DM2, HTN, hypothyroid      SOCIAL HISTORY:  Smoking Status: [ ] Current, [x ] Former, [ ] Never  Pack Years: half ppd for 5 years. stopped in 2020.  social ETOH, no illicit drugs    MEDICATIONS:  Pulmonary:    Antimicrobials:    Anticoagulants:  enoxaparin Injectable 40 milliGRAM(s) SubCutaneous every 12 hours    Onc:    GI/:  pantoprazole    Tablet 40 milliGRAM(s) Oral before breakfast  senna 2 Tablet(s) Oral at bedtime PRN    Endocrine:  dexAMETHasone     Tablet 4 milliGRAM(s) Oral every 6 hours  dextrose 40% Gel 15 Gram(s) Oral once PRN  dextrose 50% Injectable 12.5 Gram(s) IV Push once  glucagon  Injectable 1 milliGRAM(s) IntraMuscular once PRN  insulin lispro (HumaLOG) corrective regimen sliding scale   SubCutaneous Before meals and at bedtime    Cardiac:  amLODIPine   Tablet 10 milliGRAM(s) Oral daily    Other Medications:  acetaminophen   Tablet .. 650 milliGRAM(s) Oral every 6 hours PRN  dextrose 5%. 1000 milliLiter(s) IV Continuous <Continuous>  sodium chloride 0.9%. 1000 milliLiter(s) IV Continuous <Continuous>      Allergies    No Known Allergies    Intolerances        Review of Systems:   •	General: negative  •	Skin/Breast: negative  •	Ophthalmologic: negative  •	ENMT: negative  •	Respiratory and Thorax: negative  •	Cardiovascular: negative  •	Gastrointestinal: negative  •	Genitourinary: negative  •	Musculoskeletal: negative  •	Neurological: negative  •	Psychiatric: negative  •	Hematology/Lymphatics: negative  •	Endocrine: negative  •	Allergic/Immunologic: negative    Physical Exam:   • Constitutional:	Well-developed, well nourished  • Eyes:	EOMI; PERRL; no drainage or redness  • ENMT:	No oral lesions; no gross abnormalities  • Neck	No bruits; no thyromegaly or nodules  • Breasts:	not examined  • Back:	No deformity or limitation of movement  • Respiratory:	Breath Sounds equal & clear to auscultation, no accessory muscle use  • Cardiovascular:	Regular rate & rhythm, normal S1, S2; no murmurs, gallops or rubs; no S3, S4  • Gastrointestinal:	Soft, non-tender, no hepatosplenomegaly, normal bowel sounds  • Genitourinary:	not examined  • Rectal: not examined  • Extremities:	No cyanosis, clubbing or edema  • Vascular:	Equal and normal pulses (dorsalis pedis)  • Neurologica:l	not examined  • Skin:	No lesions; no rash  • Lymph Nodes:	No lymphadedenopathy  • Musculoskeletal:	No joint pain, swelling or deformity; no limitation of movement      Vital Signs Last 24 Hrs  T(C): 37 (2020 05:32), Max: 37.2 (2020 19:23)  T(F): 98.6 (2020 05:32), Max: 98.9 (2020 19:23)  HR: 92 (2020 07:20) (78 - 96)  BP: 170/83 (2020 07:20) (159/84 - 190/107)  BP(mean): --  RR: 19 (2020 07:20) (17 - 20)  SpO2: 96% (2020 07:20) (96% - 98%)     @ 07:01  -   @ 07:00  --------------------------------------------------------  IN: 0 mL / OUT: 500 mL / NET: -500 mL          LABS:      CBC Full  -  ( 2020 06:25 )  WBC Count : 11.62 K/uL  RBC Count : 4.88 M/uL  Hemoglobin : 12.9 g/dL  Hematocrit : 41.1 %  Platelet Count - Automated : 231 K/uL  Mean Cell Volume : 84.2 fl  Mean Cell Hemoglobin : 26.4 pg  Mean Cell Hemoglobin Concentration : 31.4 gm/dL  Auto Neutrophil # : 10.36 K/uL  Auto Lymphocyte # : 0.94 K/uL  Auto Monocyte # : 0.21 K/uL  Auto Eosinophil # : 0.01 K/uL  Auto Basophil # : 0.06 K/uL  Auto Neutrophil % : 89.2 %  Auto Lymphocyte % : 8.1 %  Auto Monocyte % : 1.8 %  Auto Eosinophil % : 0.1 %  Auto Basophil % : 0.5 %        135  |  100  |  x   ----------------------------<  x   4.1   |  23  |  x     Ca    9.0      2020 05:33  Mg     1.9         TPro  7.2  /  Alb  3.8  /  TBili  0.3  /  DBili  x   /  AST  22  /  ALT  19  /  AlkPhos  97  24    PT/INR - ( 2020 20:28 )   PT: 13.4 sec;   INR: 1.17          PTT - ( 2020 20:28 )  PTT:32.9 sec                  RADIOLOGY & ADDITIONAL STUDIES (The following images were personally reviewed):  ECG 2020 in chart  SR 81 bpm, no ST elevation, incomplete RBBB    Echo: 2020  EF 66 %  normal left and right ventricular size and systolic function.  grade 1 left ventricular diastolic dysfunction  moderate symmetric LVH  no significant valvular disease  small pericardial effusion without echocardiographic evidence of cardiac tamponade physiology.

## 2020-01-25 NOTE — CONSULT NOTE ADULT - ASSESSMENT
48 y/o M PMH HTN presents after fall and leg weakness x 1 day. Pt has had intermittent leg weakness for the past few weeks, but yesterday his knees "buckled" causing him to fall. The same thing happened to him again when he tried to stand up. He reports feeling "muscle spasm" in low back and intermittent numbness in b/l LEs. Pt denies fever, HA, vision changes, paresthesias, N/V, urinary/bowel incontinence, saddle anesthesia. Pt takes norvasc for HTN.

## 2020-01-25 NOTE — CONSULT NOTE ADULT - PROBLEM SELECTOR RECOMMENDATION 9
On MRI spine, T2-3, 11-12 disc bulge with cord compression, Schedule for thoracic decompression Sunday 1/26/2020

## 2020-01-25 NOTE — PROGRESS NOTE ADULT - SUBJECTIVE AND OBJECTIVE BOX
Surgery: Thoracic Decompression  Consent: Signed by patient, in chart    No Known Allergies      OVERNIGHT EVENTS: CARLITOS overnight, neuro stable    T(C): 37 (01-25-20 @ 05:32), Max: 37.2 (01-24-20 @ 19:23)  HR: 92 (01-25-20 @ 07:20) (78 - 96)  BP: 170/83 (01-25-20 @ 07:20) (159/84 - 190/107)  RR: 19 (01-25-20 @ 07:20) (17 - 20)  SpO2: 96% (01-25-20 @ 07:20) (96% - 98%)  Wt(kg): --    EXAM:  GEN: pt laying in bed, appears well, NAD  NEURO: AOx3, FC, OE spont, speech clear, face symmetric, CNII-XII intact. PERRL, EOMI, no drift. MAEx4. B/l UE 5/5 strength. B/l LE 3/5 strength. SILT  CV: RRR +S1/S2  PULM: CTAB  GI: Abd soft, NT/ND  EXT: Ext warm, nontender. +pitting edema b/l      01-25    135  |  100  |  x   ----------------------------<  x   4.1   |  23  |  x     Ca    9.0      24 Jan 2020 05:33  Mg     1.9     01-25    TPro  7.2  /  Alb  3.8  /  TBili  0.3  /  DBili  x   /  AST  22  /  ALT  19  /  AlkPhos  97  01-24    CBC Full  -  ( 25 Jan 2020 06:25 )  WBC Count : 11.62 K/uL  RBC Count : 4.88 M/uL  Hemoglobin : 12.9 g/dL  Hematocrit : 41.1 %  Platelet Count - Automated : 231 K/uL  Mean Cell Volume : 84.2 fl  Mean Cell Hemoglobin : 26.4 pg  Mean Cell Hemoglobin Concentration : 31.4 gm/dL  Auto Neutrophil # : 10.36 K/uL  Auto Lymphocyte # : 0.94 K/uL  Auto Monocyte # : 0.21 K/uL  Auto Eosinophil # : 0.01 K/uL  Auto Basophil # : 0.06 K/uL  Auto Neutrophil % : 89.2 %  Auto Lymphocyte % : 8.1 %  Auto Monocyte % : 1.8 %  Auto Eosinophil % : 0.1 %  Auto Basophil % : 0.5 %    PT/INR - ( 24 Jan 2020 20:28 )   PT: 13.4 sec;   INR: 1.17          PTT - ( 24 Jan 2020 20:28 )  PTT:32.9 sec    Pregnancy test: N/A  Type & Screen (in past 72hrs): completed    CXR: ordered  EKG: completed  ECHO: completed   Medical Clearances: Medically cleared by Dr. Blood  Other Clearances:     Last dose of antiplatelet/anticoagulation drug:    Implanted Devices (pacemaker, drug pump...etc):  []YES   [] NO                  If yes --> EPS consulted to interrogate/adjust device:    MRSA swab(exclusion - cerebral angiograms and microdiscectomy) :    Cranial surgery: Order written for hair to be shampooed night before surgery  [] yes   []no                 Assessment: Pt is 48 y/o M PMH HTN plan for thoracic decompression tomorrow with Dr. Jones    Plan:  - neuro/vitals checks  - NPO after midnight  - cont decadron, pain control  - 3M ordered    Plan d/w Dr. Mcgregor    Assessment:  Present when checked    []  GCS  E   V  M     Heart Failure: []Acute, [] acute on chronic , []chronic  Heart Failure:  [] Diastolic (HFpEF), [] Systolic (HFrEF), []Combined (HFpEF and HFrEF), [] RHF, [] Pulm HTN, [] Other    [] [place], [] ATN, [] AIN, [] other  [] CKD1, [] CKD2, [] CKD 3, [] CKD 4, [] CKD 5, []ESRD    Encephalopathy: [] Metabolic, [] Hepatic, [] toxic, [] Neurological, [] Other    Abnormal Nurtitional Status: [] malnurtition (see nutrition note), [ ]underweight: BMI < 19, [] morbid obesity: BMI >40, [] Cachexia    [] Sepsis  [] hypovolemic shock,[] cardiogenic shock, [] hemorrhagic shock, [] neuogenic shock  [] Acute Respiratory Failure  []Cerebral edema, [] Brain compression/ herniation,   [] Functional quadriplegia  [] Acute blood loss anemia

## 2020-01-25 NOTE — CONSULT NOTE ADULT - PROBLEM SELECTOR RECOMMENDATION 3
The patient's medical condition is optimized for surgery.  There is no contraindication for surgery.  There is no clinical evidence neither of angina, decompensated CHF, arrhthymias, nor valvular disease.   There is no limitation of exercise capacity.  MET is 3  .  ASA class is 3.  [place] cardiac risk factor is 0.25 .  DVT prophylaxis is indicated.  Pain control.  Early mobilization.  Avoid fluid overload. The patient's medical condition is optimized for surgery.  There is no contraindication for surgery.  There is no clinical evidence neither of angina, decompensated CHF, arrhthymias, nor valvular disease.   There is no limitation of exercise capacity.  MET is 3  .  ASA class is 3.  [place] cardiac risk factor is 0.25 .  DVT prophylaxis is indicated.  Pain control.  Early mobilization.  Avoid fluid overload.  Follow up echocardiogram and CXR. The patient's medical condition is optimized for surgery.  There is no contraindication for surgery.  There is no clinical evidence neither of angina, decompensated CHF, arrhthymias, nor valvular disease.   There is no limitation of exercise capacity.  MET is 3  .  ASA class is 3.  [place] cardiac risk factor is 0.25 .  DVT prophylaxis is indicated.  Pain control.  Early mobilization.  Avoid fluid overload.  Follow up  CXR.

## 2020-01-26 LAB
ANION GAP SERPL CALC-SCNC: 12 MMOL/L — SIGNIFICANT CHANGE UP (ref 5–17)
BASE EXCESS BLDA CALC-SCNC: -3 MMOL/L — LOW (ref -2–3)
BUN SERPL-MCNC: 20 MG/DL — SIGNIFICANT CHANGE UP (ref 7–23)
CA-I BLDA-SCNC: 1.09 MMOL/L — LOW (ref 1.12–1.3)
CALCIUM SERPL-MCNC: 9.1 MG/DL — SIGNIFICANT CHANGE UP (ref 8.4–10.5)
CHLORIDE SERPL-SCNC: 104 MMOL/L — SIGNIFICANT CHANGE UP (ref 96–108)
CO2 SERPL-SCNC: 21 MMOL/L — LOW (ref 22–31)
COHGB MFR BLDA: 0.7 % — SIGNIFICANT CHANGE UP
CREAT SERPL-MCNC: 0.99 MG/DL — SIGNIFICANT CHANGE UP (ref 0.5–1.3)
GLUCOSE BLDC GLUCOMTR-MCNC: 170 MG/DL — HIGH (ref 70–99)
GLUCOSE BLDC GLUCOMTR-MCNC: 180 MG/DL — HIGH (ref 70–99)
GLUCOSE BLDC GLUCOMTR-MCNC: 199 MG/DL — HIGH (ref 70–99)
GLUCOSE BLDC GLUCOMTR-MCNC: 209 MG/DL — HIGH (ref 70–99)
GLUCOSE SERPL-MCNC: 211 MG/DL — HIGH (ref 70–99)
HCO3 BLDA-SCNC: 23 MMOL/L — SIGNIFICANT CHANGE UP (ref 21–28)
HCT VFR BLD CALC: 38.9 % — LOW (ref 39–50)
HGB BLD-MCNC: 12.3 G/DL — LOW (ref 13–17)
HGB BLDA-MCNC: 12.9 G/DL — LOW (ref 13–17)
MAGNESIUM SERPL-MCNC: 2 MG/DL — SIGNIFICANT CHANGE UP (ref 1.6–2.6)
MCHC RBC-ENTMCNC: 26.5 PG — LOW (ref 27–34)
MCHC RBC-ENTMCNC: 31.6 GM/DL — LOW (ref 32–36)
MCV RBC AUTO: 83.7 FL — SIGNIFICANT CHANGE UP (ref 80–100)
METHGB MFR BLDA: 0.1 % — SIGNIFICANT CHANGE UP
NRBC # BLD: 0 /100 WBCS — SIGNIFICANT CHANGE UP (ref 0–0)
O2 CT VFR BLDA CALC: 18.5 ML/DL — SIGNIFICANT CHANGE UP (ref 15–23)
OXYHGB MFR BLDA: 99 % — SIGNIFICANT CHANGE UP (ref 94–100)
PCO2 BLDA: 46 MMHG — SIGNIFICANT CHANGE UP (ref 35–48)
PH BLDA: 7.32 — LOW (ref 7.35–7.45)
PHOSPHATE SERPL-MCNC: 2.8 MG/DL — SIGNIFICANT CHANGE UP (ref 2.5–4.5)
PLATELET # BLD AUTO: 246 K/UL — SIGNIFICANT CHANGE UP (ref 150–400)
PO2 BLDA: 245 MMHG — HIGH (ref 83–108)
POTASSIUM BLDA-SCNC: 4.1 MMOL/L — SIGNIFICANT CHANGE UP (ref 3.5–4.9)
POTASSIUM SERPL-MCNC: 4.1 MMOL/L — SIGNIFICANT CHANGE UP (ref 3.5–5.3)
POTASSIUM SERPL-SCNC: 4.1 MMOL/L — SIGNIFICANT CHANGE UP (ref 3.5–5.3)
RBC # BLD: 4.65 M/UL — SIGNIFICANT CHANGE UP (ref 4.2–5.8)
RBC # FLD: 13.8 % — SIGNIFICANT CHANGE UP (ref 10.3–14.5)
SAO2 % BLDA: 100 % — SIGNIFICANT CHANGE UP (ref 95–100)
SODIUM BLDA-SCNC: 136 MMOL/L — LOW (ref 138–146)
SODIUM SERPL-SCNC: 137 MMOL/L — SIGNIFICANT CHANGE UP (ref 135–145)
WBC # BLD: 11.53 K/UL — HIGH (ref 3.8–10.5)
WBC # FLD AUTO: 11.53 K/UL — HIGH (ref 3.8–10.5)

## 2020-01-26 PROCEDURE — 63046 LAM FACETEC & FORAMOT THRC: CPT

## 2020-01-26 PROCEDURE — 63048 LAM FACETEC &FORAMOT EA ADDL: CPT

## 2020-01-26 RX ORDER — SODIUM CHLORIDE 9 MG/ML
1000 INJECTION, SOLUTION INTRAVENOUS
Refills: 0 | Status: DISCONTINUED | OUTPATIENT
Start: 2020-01-26 | End: 2020-01-27

## 2020-01-26 RX ORDER — CEFAZOLIN SODIUM 1 G
3000 VIAL (EA) INJECTION EVERY 8 HOURS
Refills: 0 | Status: COMPLETED | OUTPATIENT
Start: 2020-01-26 | End: 2020-01-27

## 2020-01-26 RX ORDER — DIAZEPAM 5 MG
5 TABLET ORAL EVERY 6 HOURS
Refills: 0 | Status: DISCONTINUED | OUTPATIENT
Start: 2020-01-26 | End: 2020-01-30

## 2020-01-26 RX ORDER — HYDROMORPHONE HYDROCHLORIDE 2 MG/ML
0.5 INJECTION INTRAMUSCULAR; INTRAVENOUS; SUBCUTANEOUS
Refills: 0 | Status: DISCONTINUED | OUTPATIENT
Start: 2020-01-26 | End: 2020-01-27

## 2020-01-26 RX ORDER — HYDRALAZINE HCL 50 MG
10 TABLET ORAL EVERY 4 HOURS
Refills: 0 | Status: DISCONTINUED | OUTPATIENT
Start: 2020-01-26 | End: 2020-01-27

## 2020-01-26 RX ORDER — METOCLOPRAMIDE HCL 10 MG
10 TABLET ORAL ONCE
Refills: 0 | Status: COMPLETED | OUTPATIENT
Start: 2020-01-26 | End: 2020-01-26

## 2020-01-26 RX ORDER — OXYCODONE HYDROCHLORIDE 5 MG/1
5 TABLET ORAL EVERY 4 HOURS
Refills: 0 | Status: DISCONTINUED | OUTPATIENT
Start: 2020-01-26 | End: 2020-01-30

## 2020-01-26 RX ORDER — ACETAMINOPHEN 500 MG
1000 TABLET ORAL ONCE
Refills: 0 | Status: COMPLETED | OUTPATIENT
Start: 2020-01-26 | End: 2020-01-26

## 2020-01-26 RX ORDER — ONDANSETRON 8 MG/1
4 TABLET, FILM COATED ORAL EVERY 6 HOURS
Refills: 0 | Status: DISCONTINUED | OUTPATIENT
Start: 2020-01-26 | End: 2020-02-01

## 2020-01-26 RX ADMIN — Medication 1 APPLICATION(S): at 07:22

## 2020-01-26 RX ADMIN — Medication 4 MILLIGRAM(S): at 05:32

## 2020-01-26 RX ADMIN — Medication 4: at 16:00

## 2020-01-26 RX ADMIN — Medication 10 MILLIGRAM(S): at 00:55

## 2020-01-26 RX ADMIN — Medication 400 MILLIGRAM(S): at 21:43

## 2020-01-26 RX ADMIN — Medication 1000 MILLIGRAM(S): at 22:20

## 2020-01-26 RX ADMIN — SODIUM CHLORIDE 125 MILLILITER(S): 9 INJECTION INTRAMUSCULAR; INTRAVENOUS; SUBCUTANEOUS at 00:57

## 2020-01-26 RX ADMIN — Medication 2: at 07:19

## 2020-01-26 RX ADMIN — Medication 200 MILLIGRAM(S): at 18:24

## 2020-01-26 RX ADMIN — Medication 10 MILLIGRAM(S): at 15:36

## 2020-01-26 RX ADMIN — Medication 2: at 22:41

## 2020-01-26 RX ADMIN — PANTOPRAZOLE SODIUM 40 MILLIGRAM(S): 20 TABLET, DELAYED RELEASE ORAL at 05:33

## 2020-01-26 RX ADMIN — AMLODIPINE BESYLATE 10 MILLIGRAM(S): 2.5 TABLET ORAL at 05:32

## 2020-01-26 RX ADMIN — Medication 10 MILLIGRAM(S): at 05:31

## 2020-01-26 RX ADMIN — ONDANSETRON 4 MILLIGRAM(S): 8 TABLET, FILM COATED ORAL at 14:16

## 2020-01-26 NOTE — PROGRESS NOTE ADULT - SUBJECTIVE AND OBJECTIVE BOX
NEUROSURGERY POST OP NOTE:    POD# 0 S/P T11-T12 laminectomy    S: Pt c/o incisional pain but states he's able to move his legs better.       T(C): 35.8 (01-26-20 @ 12:57), Max: 37.1 (01-25-20 @ 22:00)  HR: 71 (01-26-20 @ 17:14) (70 - 96)  BP: 156/72 (01-26-20 @ 17:14) (119/66 - 185/105)  RR: 16 (01-26-20 @ 17:14) (13 - 19)  SpO2: 96% (01-26-20 @ 17:14) (75% - 97%)      01-25-20 @ 07:01  -  01-26-20 @ 07:00  --------------------------------------------------------  IN: 2200 mL / OUT: 3325 mL / NET: -1125 mL    01-26-20 @ 07:01  -  01-26-20 @ 18:18  --------------------------------------------------------  IN: 2175 mL / OUT: 500 mL / NET: 1675 mL        acetaminophen   Tablet .. 650 milliGRAM(s) Oral every 6 hours PRN  acetaminophen  IVPB .. 1000 milliGRAM(s) IV Intermittent once  amLODIPine   Tablet 10 milliGRAM(s) Oral daily  ceFAZolin   IVPB 3000 milliGRAM(s) IV Intermittent every 8 hours  dextrose 40% Gel 15 Gram(s) Oral once PRN  dextrose 5%. 1000 milliLiter(s) IV Continuous <Continuous>  dextrose 50% Injectable 12.5 Gram(s) IV Push once  diazepam    Tablet 5 milliGRAM(s) Oral every 6 hours PRN  glucagon  Injectable 1 milliGRAM(s) IntraMuscular once PRN  hydrALAZINE Injectable 10 milliGRAM(s) IV Push every 4 hours PRN  HYDROmorphone  Injectable 0.5 milliGRAM(s) IV Push every 30 minutes PRN  insulin lispro (HumaLOG) corrective regimen sliding scale   SubCutaneous Before meals and at bedtime  lactated ringers. 1000 milliLiter(s) IV Continuous <Continuous>  ondansetron Injectable 4 milliGRAM(s) IV Push every 6 hours PRN  oxyCODONE    IR 5 milliGRAM(s) Oral every 4 hours PRN  pantoprazole    Tablet 40 milliGRAM(s) Oral before breakfast  senna 2 Tablet(s) Oral at bedtime PRN      RADIOLOGY:     Exam:  FREDDIE b/l LE 3-4/5 sensory intact    WOUND/DRAINS: Hemovac x1    DEVICES:       Assessment: 49yMale s/p T11-12 laminectomy      Plan:    -pain management  -PT eval  -labs AM  -follow hemovac output  -D/W

## 2020-01-26 NOTE — PRE-OP CHECKLIST - HIBICLENS SHOWER 1 DATE
26-Jan-2020 06:00

## 2020-01-26 NOTE — PACU DISCHARGE NOTE - COMMENTS
d/c to telemetry in stable condition.. no distress and discomfort..neurologically stable d/c to telemetry in stable condition.. no distress and discomfort..neurologically and neurovascular motor/sensory nerve intact stable

## 2020-01-27 DIAGNOSIS — N17.9 ACUTE KIDNEY FAILURE, UNSPECIFIED: ICD-10-CM

## 2020-01-27 DIAGNOSIS — Z98.890 OTHER SPECIFIED POSTPROCEDURAL STATES: ICD-10-CM

## 2020-01-27 DIAGNOSIS — D72.828 OTHER ELEVATED WHITE BLOOD CELL COUNT: ICD-10-CM

## 2020-01-27 LAB
ANION GAP SERPL CALC-SCNC: 9 MMOL/L — SIGNIFICANT CHANGE UP (ref 5–17)
BUN SERPL-MCNC: 32 MG/DL — HIGH (ref 7–23)
CALCIUM SERPL-MCNC: 8.8 MG/DL — SIGNIFICANT CHANGE UP (ref 8.4–10.5)
CHLORIDE SERPL-SCNC: 102 MMOL/L — SIGNIFICANT CHANGE UP (ref 96–108)
CO2 SERPL-SCNC: 24 MMOL/L — SIGNIFICANT CHANGE UP (ref 22–31)
CREAT SERPL-MCNC: 1.35 MG/DL — HIGH (ref 0.5–1.3)
GLUCOSE BLDC GLUCOMTR-MCNC: 131 MG/DL — HIGH (ref 70–99)
GLUCOSE BLDC GLUCOMTR-MCNC: 143 MG/DL — HIGH (ref 70–99)
GLUCOSE BLDC GLUCOMTR-MCNC: 160 MG/DL — HIGH (ref 70–99)
GLUCOSE BLDC GLUCOMTR-MCNC: 173 MG/DL — HIGH (ref 70–99)
GLUCOSE SERPL-MCNC: 157 MG/DL — HIGH (ref 70–99)
HCT VFR BLD CALC: 37.1 % — LOW (ref 39–50)
HGB BLD-MCNC: 12.1 G/DL — LOW (ref 13–17)
MAGNESIUM SERPL-MCNC: 2 MG/DL — SIGNIFICANT CHANGE UP (ref 1.6–2.6)
MCHC RBC-ENTMCNC: 27.3 PG — SIGNIFICANT CHANGE UP (ref 27–34)
MCHC RBC-ENTMCNC: 32.6 GM/DL — SIGNIFICANT CHANGE UP (ref 32–36)
MCV RBC AUTO: 83.7 FL — SIGNIFICANT CHANGE UP (ref 80–100)
NRBC # BLD: 0 /100 WBCS — SIGNIFICANT CHANGE UP (ref 0–0)
PHOSPHATE SERPL-MCNC: 3.2 MG/DL — SIGNIFICANT CHANGE UP (ref 2.5–4.5)
PLATELET # BLD AUTO: 258 K/UL — SIGNIFICANT CHANGE UP (ref 150–400)
POTASSIUM SERPL-MCNC: 4.1 MMOL/L — SIGNIFICANT CHANGE UP (ref 3.5–5.3)
POTASSIUM SERPL-SCNC: 4.1 MMOL/L — SIGNIFICANT CHANGE UP (ref 3.5–5.3)
RBC # BLD: 4.43 M/UL — SIGNIFICANT CHANGE UP (ref 4.2–5.8)
RBC # FLD: 14.2 % — SIGNIFICANT CHANGE UP (ref 10.3–14.5)
SODIUM SERPL-SCNC: 135 MMOL/L — SIGNIFICANT CHANGE UP (ref 135–145)
WBC # BLD: 16.57 K/UL — HIGH (ref 3.8–10.5)
WBC # FLD AUTO: 16.57 K/UL — HIGH (ref 3.8–10.5)

## 2020-01-27 PROCEDURE — 99232 SBSQ HOSP IP/OBS MODERATE 35: CPT | Mod: NC

## 2020-01-27 PROCEDURE — 99232 SBSQ HOSP IP/OBS MODERATE 35: CPT | Mod: GC

## 2020-01-27 PROCEDURE — 99222 1ST HOSP IP/OBS MODERATE 55: CPT

## 2020-01-27 RX ORDER — LOSARTAN POTASSIUM 100 MG/1
25 TABLET, FILM COATED ORAL DAILY
Refills: 0 | Status: DISCONTINUED | OUTPATIENT
Start: 2020-01-27 | End: 2020-01-27

## 2020-01-27 RX ORDER — LABETALOL HCL 100 MG
10 TABLET ORAL ONCE
Refills: 0 | Status: COMPLETED | OUTPATIENT
Start: 2020-01-27 | End: 2020-01-27

## 2020-01-27 RX ORDER — LABETALOL HCL 100 MG
10 TABLET ORAL
Refills: 0 | Status: DISCONTINUED | OUTPATIENT
Start: 2020-01-27 | End: 2020-01-28

## 2020-01-27 RX ORDER — ENOXAPARIN SODIUM 100 MG/ML
40 INJECTION SUBCUTANEOUS EVERY 24 HOURS
Refills: 0 | Status: DISCONTINUED | OUTPATIENT
Start: 2020-01-27 | End: 2020-02-01

## 2020-01-27 RX ORDER — SODIUM CHLORIDE 9 MG/ML
1000 INJECTION, SOLUTION INTRAVENOUS
Refills: 0 | Status: DISCONTINUED | OUTPATIENT
Start: 2020-01-27 | End: 2020-02-01

## 2020-01-27 RX ORDER — HYDRALAZINE HCL 50 MG
10 TABLET ORAL
Refills: 0 | Status: DISCONTINUED | OUTPATIENT
Start: 2020-01-27 | End: 2020-01-28

## 2020-01-27 RX ORDER — LOSARTAN POTASSIUM 100 MG/1
25 TABLET, FILM COATED ORAL DAILY
Refills: 0 | Status: DISCONTINUED | OUTPATIENT
Start: 2020-01-27 | End: 2020-01-30

## 2020-01-27 RX ORDER — OXYCODONE HYDROCHLORIDE 5 MG/1
10 TABLET ORAL EVERY 6 HOURS
Refills: 0 | Status: DISCONTINUED | OUTPATIENT
Start: 2020-01-27 | End: 2020-02-01

## 2020-01-27 RX ADMIN — OXYCODONE HYDROCHLORIDE 5 MILLIGRAM(S): 5 TABLET ORAL at 06:58

## 2020-01-27 RX ADMIN — ENOXAPARIN SODIUM 40 MILLIGRAM(S): 100 INJECTION SUBCUTANEOUS at 20:50

## 2020-01-27 RX ADMIN — Medication 10 MILLIGRAM(S): at 17:05

## 2020-01-27 RX ADMIN — Medication 10 MILLIGRAM(S): at 05:30

## 2020-01-27 RX ADMIN — Medication 10 MILLIGRAM(S): at 23:59

## 2020-01-27 RX ADMIN — Medication 2: at 17:11

## 2020-01-27 RX ADMIN — Medication 10 MILLIGRAM(S): at 20:49

## 2020-01-27 RX ADMIN — OXYCODONE HYDROCHLORIDE 10 MILLIGRAM(S): 5 TABLET ORAL at 12:56

## 2020-01-27 RX ADMIN — Medication 200 MILLIGRAM(S): at 10:59

## 2020-01-27 RX ADMIN — Medication 200 MILLIGRAM(S): at 01:42

## 2020-01-27 RX ADMIN — PANTOPRAZOLE SODIUM 40 MILLIGRAM(S): 20 TABLET, DELAYED RELEASE ORAL at 06:15

## 2020-01-27 RX ADMIN — SODIUM CHLORIDE 125 MILLILITER(S): 9 INJECTION, SOLUTION INTRAVENOUS at 01:42

## 2020-01-27 RX ADMIN — Medication 10 MILLIGRAM(S): at 21:53

## 2020-01-27 RX ADMIN — SENNA PLUS 2 TABLET(S): 8.6 TABLET ORAL at 20:49

## 2020-01-27 RX ADMIN — Medication 2: at 11:59

## 2020-01-27 RX ADMIN — AMLODIPINE BESYLATE 10 MILLIGRAM(S): 2.5 TABLET ORAL at 06:15

## 2020-01-27 RX ADMIN — LOSARTAN POTASSIUM 25 MILLIGRAM(S): 100 TABLET, FILM COATED ORAL at 22:28

## 2020-01-27 RX ADMIN — OXYCODONE HYDROCHLORIDE 5 MILLIGRAM(S): 5 TABLET ORAL at 07:50

## 2020-01-27 RX ADMIN — OXYCODONE HYDROCHLORIDE 10 MILLIGRAM(S): 5 TABLET ORAL at 19:10

## 2020-01-27 RX ADMIN — OXYCODONE HYDROCHLORIDE 10 MILLIGRAM(S): 5 TABLET ORAL at 20:10

## 2020-01-27 RX ADMIN — Medication 5 MILLIGRAM(S): at 15:59

## 2020-01-27 RX ADMIN — OXYCODONE HYDROCHLORIDE 10 MILLIGRAM(S): 5 TABLET ORAL at 11:56

## 2020-01-27 NOTE — PHYSICAL THERAPY INITIAL EVALUATION ADULT - GAIT DEVIATIONS NOTED, PT EVAL
decreased anna/decreased step length/decreased stride length/increased stride width/decreased swing-to-stance ratio decreased anna/decreased step length/decreased weight-shifting ability

## 2020-01-27 NOTE — PHYSICAL THERAPY INITIAL EVALUATION ADULT - FOLLOWS COMMANDS/ANSWERS QUESTIONS, REHAB EVAL
100% of the time 100% of the time/able to follow multistep instructions/able to follow single-step instructions

## 2020-01-27 NOTE — PHYSICAL THERAPY INITIAL EVALUATION ADULT - ACTIVE RANGE OF MOTION EXAMINATION, REHAB EVAL
suha. upper extremity Active ROM was WNL (within normal limits)/bilateral lower extremity Active ROM was WNL (within normal limits) bilat shoulder flex ~ degrees AROM/bilateral upper extremity Active ROM was WFL (within functional limits)/bilateral  lower extremity Active ROM was WFL (within functional limits)

## 2020-01-27 NOTE — PHYSICAL THERAPY INITIAL EVALUATION ADULT - ADDITIONAL COMMENTS
Pt lived with spouse and children in an apartment with ramp/stairs/ elevator access. Uses SC for mobility indoors and outdoors. physical therapy is R hand dominant and uses eyeglasses. Pt lives with spouse and children in an apartment with ramp and elevator access. PTA: patient indep with all functional mobility however, started to use SC for mobility indoors and outdoor ~ 1month 2/2 to weakness R>L LE and complaints of buckling. Denies falls. Pt is R hand dominant and uses eyeglasses -corrective.

## 2020-01-27 NOTE — OCCUPATIONAL THERAPY INITIAL EVALUATION ADULT - PERTINENT HX OF CURRENT PROBLEM, REHAB EVAL
S/P Falls w/ bucking of LEs. Cord compression. Thoracic cord compresion and stenosis T11-T12. Now S/P T11-T12 laminectomy

## 2020-01-27 NOTE — PROGRESS NOTE ADULT - PROBLEM SELECTOR PLAN 5
IV fluids and follow Cr.  Most likely related to surgery abd his body mass.  Avoid nephrotoxic drugs

## 2020-01-27 NOTE — PROGRESS NOTE ADULT - ASSESSMENT
50 y/o M PMH HTN presents after fall and leg weakness x 1 day. Pt has had intermittent leg weakness for the past few weeks, but yesterday his knees "buckled" causing him to fall. The same thing happened to him again when he tried to stand up. He reports feeling "muscle spasm" in low back and intermittent numbness in b/l LEs. Pt denies fever, HA, vision changes, paresthesias, N/V, urinary/bowel incontinence, saddle anesthesia. Pt takes norvasc for HTN.

## 2020-01-27 NOTE — PHYSICAL THERAPY INITIAL EVALUATION ADULT - MANUAL MUSCLE TESTING RESULTS, REHAB EVAL
B UE/LE musculature grossly graded > 3/5 Bilat UE >/= 4/5; Bilat LE right hip flex 3+/5, right knee flex/exten 4/5; Left hip flex 4-/5, Left knee flex/exten 4+/5. Bilat DF/PF 5/5

## 2020-01-27 NOTE — PHYSICAL THERAPY INITIAL EVALUATION ADULT - PERTINENT HX OF CURRENT PROBLEM, REHAB EVAL
Pt is a 50 y/o M PMH HTN presents after fall and leg weakness x 1 day. Pt has had intermittent leg weakness for the past few weeks and had a fall 1 day PTA 2/2 R knee buckling

## 2020-01-27 NOTE — PHYSICAL THERAPY INITIAL EVALUATION ADULT - PLANNED THERAPY INTERVENTIONS, PT EVAL
balance training/bed mobility training/gait training/lumbar stabilization/postural re-education/ROM/strengthening/transfer training

## 2020-01-27 NOTE — PROGRESS NOTE ADULT - SUBJECTIVE AND OBJECTIVE BOX
Interval Events: Reviewed  Patient seen and examined at bedside.    Patient is a 49y old  Male who presents with a chief complaint of Cord Compression (27 Jan 2020 08:37)    he is doing better and painis controlled  PAST MEDICAL & SURGICAL HISTORY:  HTN (hypertension)  No significant past surgical history      MEDICATIONS:  Pulmonary:    Antimicrobials:    Anticoagulants:  enoxaparin Injectable 40 milliGRAM(s) SubCutaneous every 24 hours    Cardiac:  amLODIPine   Tablet 10 milliGRAM(s) Oral daily  hydrALAZINE Injectable 10 milliGRAM(s) IV Push every 4 hours PRN  losartan 25 milliGRAM(s) Oral daily      Allergies    No Known Allergies    Intolerances        Vital Signs Last 24 Hrs  T(C): 36.8 (27 Jan 2020 16:42), Max: 37 (27 Jan 2020 14:03)  T(F): 98.2 (27 Jan 2020 16:42), Max: 98.6 (27 Jan 2020 14:03)  HR: 92 (27 Jan 2020 18:06) (71 - 95)  BP: 159/79 (27 Jan 2020 18:06) (134/70 - 186/100)  BP(mean): --  RR: 22 (27 Jan 2020 18:06) (16 - 25)  SpO2: 94% (27 Jan 2020 18:06) (94% - 99%)    01-26 @ 07:01 - 01-27 @ 07:00  --------------------------------------------------------  IN: 2550 mL / OUT: 1325 mL / NET: 1225 mL    01-27 @ 07:01 - 01-27 @ 20:02  --------------------------------------------------------  IN: 1525 mL / OUT: 948 mL / NET: 577 mL          Review of Systems:   •	General: negative  •	Skin/Breast: negative  •	Ophthalmologic: negative  •	ENMT: negative  •	Respiratory and Thorax: negative  •	Cardiovascular: negative  •	Gastrointestinal: negative  •	Genitourinary: negative  •	Musculoskeletal: negative  •	Neurological: negative  •	Psychiatric: negative  •	Hematology/Lymphatics: negative  •	Endocrine: negative  •	Allergic/Immunologic: negative    Physical Exam:   • Constitutional:	Well-developed, well nourished  • Eyes:	EOMI; PERRL; no drainage or redness  • ENMT:	No oral lesions; no gross abnormalities  • Neck	No bruits; no thyromegaly or nodules  • Breasts:	not examined  • Back:	No deformity or limitation of movement  • Respiratory:	Breath Sounds equal & clear to percussion & auscultation, no accessory muscle use  • Cardiovascular:	Regular rate & rhythm, normal S1, S2; no murmurs, gallops or rubs; no S3, S4  • Gastrointestinal:	Soft, non-tender, no hepatosplenomegaly, normal bowel sounds  • Genitourinary:	not examined  • Rectal: not examined  • Extremities:	No cyanosis, clubbing or edema  • Vascular:	Equal and normal pulses (carotid, femoral, dorsalis pedis)  • Neurologica:l	not examined  • Skin:	No lesions; no rash  • Lymph Nodes:	No lymphadedenopathy  • Musculoskeletal:	No joint pain, swelling or deformity; no limitation of movement        LABS:  ABG - ( 26 Jan 2020 10:49 )  pH, Arterial: 7.32  pH, Blood: x     /  pCO2: 46    /  pO2: 245   / HCO3: 23    / Base Excess: -3.0  /  SaO2: x                   CBC Full  -  ( 27 Jan 2020 07:28 )  WBC Count : 16.57 K/uL  RBC Count : 4.43 M/uL  Hemoglobin : 12.1 g/dL  Hematocrit : 37.1 %  Platelet Count - Automated : 258 K/uL  Mean Cell Volume : 83.7 fl  Mean Cell Hemoglobin : 27.3 pg  Mean Cell Hemoglobin Concentration : 32.6 gm/dL  Auto Neutrophil # : x  Auto Lymphocyte # : x  Auto Monocyte # : x  Auto Eosinophil # : x  Auto Basophil # : x  Auto Neutrophil % : x  Auto Lymphocyte % : x  Auto Monocyte % : x  Auto Eosinophil % : x  Auto Basophil % : x    01-27    135  |  102  |  32<H>  ----------------------------<  157<H>  4.1   |  24  |  1.35<H>    Ca    8.8      27 Jan 2020 07:28  Phos  3.2     01-27  Mg     2.0     01-27                          RADIOLOGY & ADDITIONAL STUDIES (The following images were personally reviewed):  Andrade:                                     No  Urine output:                       adequate  DVT prophylaxis:                 Yes  Flattus:                                  Yes  Bowel movement:              No

## 2020-01-27 NOTE — OCCUPATIONAL THERAPY INITIAL EVALUATION ADULT - ADDITIONAL COMMENTS
Dr mendenhall
Pt lives with his wife in apt complex w/ elevator and ramp to enter. Pt reports h/o of recent falls at home 2/2 LE weakness/buckling. Pt was independent in mobility and ADLs prior to most recent incident. Reports that he was using a cane for ~1 month 2/2 fall risks.

## 2020-01-27 NOTE — OCCUPATIONAL THERAPY INITIAL EVALUATION ADULT - GENERAL OBSERVATIONS, REHAB EVAL
Pt is right hand dominant. Pt's RN [place] aware of intent to eval/tx; cleared Pt. Pt received in supine - +Hemovac, IVs (detached for activities by RN), telemetry, SCDs. Pt with fair affect; +glasses. Pt agreeable to OT eval.

## 2020-01-27 NOTE — OCCUPATIONAL THERAPY INITIAL EVALUATION ADULT - MD ORDER
48 y/o M PMH HTN presents after fall and leg weakness x 1 day. Pt has had intermittent leg weakness for the past few weeks, but yesterday his knees "buckled" causing him to fall. The same thing happened to him again when he tried to stand up. He reports feeling "muscle spasm" in low back and intermittent numbness in b/l LEs.

## 2020-01-27 NOTE — PHYSICAL THERAPY INITIAL EVALUATION ADULT - IMPAIRMENTS CONTRIBUTING TO GAIT DEVIATIONS, PT EVAL
impaired balance/decreased flexibility/decreased strength decrease bilat LE clearance off surface 2/2 decrease Weightshift, increase weightbearing into RW requiring VCing for safety and DME use and precautions/impaired balance/decreased strength

## 2020-01-27 NOTE — CONSULT NOTE ADULT - SUBJECTIVE AND OBJECTIVE BOX
Patient is a 49y old  Male who presents with a chief complaint of Cord Compression (26 Jan 2020 18:17)        HPI:  Pt is a 50 y/o M PMH HTN presents after fall and leg weakness x 1 day. Pt has had intermittent leg weakness for the past few weeks, but yesterday his knees "buckled" causing him to fall. The same thing happened to him again when he tried to stand up. He reports feeling "muscle spasm" in low back and intermittent numbness in b/l LEs. Pt denies fever, HA, vision changes, paresthesias, N/V, urinary/bowel incontinence, saddle anesthesia. Pt takes norvasc for HTN. (24 Jan 2020 19:16)   back pain - radicular symptoms in the LE     Allergies  No Known Allergies      Health Issues  LEG WEAKNESS BILATERAL  No pertinent family history in first degree relatives  Handoff  MEWS Score  HTN (hypertension)  Leg weakness, bilateral  Preop examination  Essential hypertension  Leg weakness, bilateral  No significant past surgical history  WEAKNESS        FAMILY HISTORY:  No pertinent family history in first degree relatives      MEDICATIONS  (STANDING):  amLODIPine   Tablet 10 milliGRAM(s) Oral daily  ceFAZolin   IVPB 3000 milliGRAM(s) IV Intermittent every 8 hours  dextrose 5%. 1000 milliLiter(s) (50 mL/Hr) IV Continuous <Continuous>  dextrose 50% Injectable 12.5 Gram(s) IV Push once  insulin lispro (HumaLOG) corrective regimen sliding scale   SubCutaneous Before meals and at bedtime  lactated ringers. 1000 milliLiter(s) (125 mL/Hr) IV Continuous <Continuous>  pantoprazole    Tablet 40 milliGRAM(s) Oral before breakfast    MEDICATIONS  (PRN):  acetaminophen   Tablet .. 650 milliGRAM(s) Oral every 6 hours PRN Temp greater or equal to 38C (100.4F), Mild Pain (1 - 3)  dextrose 40% Gel 15 Gram(s) Oral once PRN Blood Glucose LESS THAN 70 milliGRAM(s)/deciliter  diazepam    Tablet 5 milliGRAM(s) Oral every 6 hours PRN back spasm  glucagon  Injectable 1 milliGRAM(s) IntraMuscular once PRN Glucose LESS THAN 70 milligrams/deciliter  hydrALAZINE Injectable 10 milliGRAM(s) IV Push every 4 hours PRN SBP>160  HYDROmorphone  Injectable 0.5 milliGRAM(s) IV Push every 30 minutes PRN Severe Pain (7 - 10)  ondansetron Injectable 4 milliGRAM(s) IV Push every 6 hours PRN Nausea and/or Vomiting  oxyCODONE    IR 5 milliGRAM(s) Oral every 4 hours PRN Moderate Pain (4 - 6)  senna 2 Tablet(s) Oral at bedtime PRN Constipation      PAST MEDICAL & SURGICAL HISTORY:  HTN (hypertension)  No significant past surgical history      Labs                          12.1   16.57 )-----------( 258      ( 27 Jan 2020 07:28 )             37.1     01-27    135  |  102  |  x   ----------------------------<  157<H>  4.1   |  24  |  1.35<H>    Ca    8.8      27 Jan 2020 07:28  Phos  3.2     01-27  Mg     2.0     01-27        Radiology:    Physical Exam    MENTAL STATUS  -Level of Consciousness- awake    Orientation- person, place time  Language- aphasia/ dysarthria- nl  Memory- recent and remote- nl    Cranial Nerve 1- 12  Pupils- equal and reactive  Eye movements-nl  Facial - no asymmetry   Lower CN-nl    Gait and Station- n/a    MOTOR  Upper- nl  Lower- no foot drop    Reflexes- decreased    Sensation- no sensory level    Cerebellar- no tremors    vascular - no bruits    Assessment- lumbar radiculopathy    Plan no change post op

## 2020-01-27 NOTE — PROGRESS NOTE ADULT - SUBJECTIVE AND OBJECTIVE BOX
HPI:  Pt is a 50 y/o M PMH HTN presents after fall and leg weakness x 1 day. Pt has had intermittent leg weakness for the past few weeks, but yesterday his knees "buckled" causing him to fall. The same thing happened to him again when he tried to stand up. He reports feeling "muscle spasm" in low back and intermittent numbness in b/l LEs. Pt denies fever, HA, vision changes, paresthesias, N/V, urinary/bowel incontinence, saddle anesthesia. Pt takes norvasc for HTN. (24 Jan 2020 19:16)      1/26 POD# 0 S/P T11-T12 laminectomy  1/27 POD#1 CARLITOS overnight, pain is well controlled, HMV, Trend Cr (1.35), Cont LR @ 80cc/hr    ICU Vital Signs Last 24 Hrs  T(C): 36.6 (27 Jan 2020 05:33), Max: 36.6 (27 Jan 2020 05:33)  T(F): 97.8 (27 Jan 2020 05:33), Max: 97.8 (27 Jan 2020 05:33)  HR: 83 (27 Jan 2020 06:25) (70 - 83)  BP: 169/82 (27 Jan 2020 06:25) (143/69 - 186/100)  BP(mean): 109 (26 Jan 2020 15:42) (97 - 109)  ABP: 160/78 (26 Jan 2020 15:12) (142/71 - 171/80)  ABP(mean): 106 (26 Jan 2020 15:12) (93 - 110)  RR: 16 (27 Jan 2020 05:33) (13 - 19)  SpO2: 99% (27 Jan 2020 05:33) (90% - 99%)      Exam:  FRAZIER b/l LE 3-4/5,   sensory to LT grossly intact    WOUND/DRAINS: Hemovac x1    DEVICES:       Assessment: 49yMale s/p T11-12 laminectomy POD#1      Plan:  - Trend Cr/BUN, Cont LR @ 80cc/hr  -pain management  -PT eval  -labs AM  -trend hemovac output  -D/W       Assessment:  Present when checked    []  GCS  E   V  M     Heart Failure: []Acute, [] acute on chronic , []chronic  Heart Failure:  [] Diastolic (HFpEF), [] Systolic (HFrEF), []Combined (HFpEF and HFrEF), [] RHF, [] Pulm HTN, [] Other    [] [place], [] ATN, [] AIN, [] other  [] CKD1, [] CKD2, [] CKD 3, [] CKD 4, [] CKD 5, []ESRD    Encephalopathy: [] Metabolic, [] Hepatic, [] toxic, [] Neurological, [] Other    Abnormal Nurtitional Status: [] malnurtition (see nutrition note), [ ]underweight: BMI < 19, [] morbid obesity: BMI >40, [] Cachexia    [] Sepsis  [] hypovolemic shock,[] cardiogenic shock, [] hemorrhagic shock, [] neuogenic shock  [] Acute Respiratory Failure  []Cerebral edema, [] Brain compression/ herniation,   [] Functional quadriplegia  [] Acute blood loss anemia

## 2020-01-27 NOTE — PHYSICAL THERAPY INITIAL EVALUATION ADULT - IMPAIRED TRANSFERS: SIT/STAND, REHAB EVAL
impaired balance/decreased flexibility/decreased strength + fall risk/impaired balance/decreased strength

## 2020-01-27 NOTE — OCCUPATIONAL THERAPY INITIAL EVALUATION ADULT - PLANNED THERAPY INTERVENTIONS, OT EVAL
ADL retraining/IADL retraining/balance training/bed mobility training/motor coordination training/neuromuscular re-education/strengthening/transfer training

## 2020-01-27 NOTE — OCCUPATIONAL THERAPY INITIAL EVALUATION ADULT - IMPAIRMENTS CONTRIBUTING IMPAIRED BED MOBILITY, REHAB EVAL
impaired balance/impaired coordination/decreased flexibility/impaired postural control/decreased strength

## 2020-01-27 NOTE — PHYSICAL THERAPY INITIAL EVALUATION ADULT - GENERAL OBSERVATIONS, REHAB EVAL
Pt received supine head of bed elev 45 degrees, +SCDs, +V line, hep lock- B dorsal hand, +telemetry, no acute distress. Pt presents with decreased bed mobility, transfers, and ambulation 2/2 weakness of core/LE mms. limiting functional mobility. Pt received supine head of bed elev 45 degrees, + hemovac x1 back region, bilat. +SCDs, +IV line x1 LUE, hep lock- B dorsal hands, +telemetry, no acute distress. Pt presents with decreased bed mobility, transfers, and ambulation 2/2 weakness of core/LE mms. limiting functional mobility. Cleared for PT/OOB activity by KAMARI Harrison and ANNA Deleon- neurosurgery. Pt received supine head of bed elev 45 degrees, +wound dressing back region C/D/I, + hemovac x1 back region, bilat. +SCDs, +IV line x1 LUE, hep lock- B dorsal hands, +telemetry, no acute distress. Pt presents with decreased bed mobility, transfers, and ambulation 2/2 weakness of core/LE mms. limiting functional mobility. Cleared for PT/OOB activity by KAMARI Harrison and ANNA Deleon- neurosurgery.

## 2020-01-28 LAB
ANION GAP SERPL CALC-SCNC: 12 MMOL/L — SIGNIFICANT CHANGE UP (ref 5–17)
BUN SERPL-MCNC: 31 MG/DL — HIGH (ref 7–23)
CALCIUM SERPL-MCNC: 9 MG/DL — SIGNIFICANT CHANGE UP (ref 8.4–10.5)
CHLORIDE SERPL-SCNC: 99 MMOL/L — SIGNIFICANT CHANGE UP (ref 96–108)
CO2 SERPL-SCNC: 24 MMOL/L — SIGNIFICANT CHANGE UP (ref 22–31)
CREAT SERPL-MCNC: 1.17 MG/DL — SIGNIFICANT CHANGE UP (ref 0.5–1.3)
GLUCOSE BLDC GLUCOMTR-MCNC: 162 MG/DL — HIGH (ref 70–99)
GLUCOSE BLDC GLUCOMTR-MCNC: 169 MG/DL — HIGH (ref 70–99)
GLUCOSE BLDC GLUCOMTR-MCNC: 171 MG/DL — HIGH (ref 70–99)
GLUCOSE BLDC GLUCOMTR-MCNC: 204 MG/DL — HIGH (ref 70–99)
GLUCOSE SERPL-MCNC: 192 MG/DL — HIGH (ref 70–99)
HCT VFR BLD CALC: 35.7 % — LOW (ref 39–50)
HGB BLD-MCNC: 11.6 G/DL — LOW (ref 13–17)
MAGNESIUM SERPL-MCNC: 2 MG/DL — SIGNIFICANT CHANGE UP (ref 1.6–2.6)
MCHC RBC-ENTMCNC: 27.1 PG — SIGNIFICANT CHANGE UP (ref 27–34)
MCHC RBC-ENTMCNC: 32.5 GM/DL — SIGNIFICANT CHANGE UP (ref 32–36)
MCV RBC AUTO: 83.4 FL — SIGNIFICANT CHANGE UP (ref 80–100)
NRBC # BLD: 0 /100 WBCS — SIGNIFICANT CHANGE UP (ref 0–0)
PHOSPHATE SERPL-MCNC: 2.6 MG/DL — SIGNIFICANT CHANGE UP (ref 2.5–4.5)
PLATELET # BLD AUTO: 258 K/UL — SIGNIFICANT CHANGE UP (ref 150–400)
POTASSIUM SERPL-MCNC: 3.8 MMOL/L — SIGNIFICANT CHANGE UP (ref 3.5–5.3)
POTASSIUM SERPL-SCNC: 3.8 MMOL/L — SIGNIFICANT CHANGE UP (ref 3.5–5.3)
RBC # BLD: 4.28 M/UL — SIGNIFICANT CHANGE UP (ref 4.2–5.8)
RBC # FLD: 14.3 % — SIGNIFICANT CHANGE UP (ref 10.3–14.5)
SODIUM SERPL-SCNC: 135 MMOL/L — SIGNIFICANT CHANGE UP (ref 135–145)
WBC # BLD: 11.78 K/UL — HIGH (ref 3.8–10.5)
WBC # FLD AUTO: 11.78 K/UL — HIGH (ref 3.8–10.5)

## 2020-01-28 PROCEDURE — 99232 SBSQ HOSP IP/OBS MODERATE 35: CPT

## 2020-01-28 PROCEDURE — 99232 SBSQ HOSP IP/OBS MODERATE 35: CPT | Mod: GC

## 2020-01-28 RX ORDER — LABETALOL HCL 100 MG
20 TABLET ORAL
Refills: 0 | Status: DISCONTINUED | OUTPATIENT
Start: 2020-01-28 | End: 2020-01-28

## 2020-01-28 RX ORDER — HYDRALAZINE HCL 50 MG
20 TABLET ORAL
Refills: 0 | Status: DISCONTINUED | OUTPATIENT
Start: 2020-01-28 | End: 2020-02-01

## 2020-01-28 RX ADMIN — AMLODIPINE BESYLATE 10 MILLIGRAM(S): 2.5 TABLET ORAL at 05:28

## 2020-01-28 RX ADMIN — OXYCODONE HYDROCHLORIDE 10 MILLIGRAM(S): 5 TABLET ORAL at 10:33

## 2020-01-28 RX ADMIN — PANTOPRAZOLE SODIUM 40 MILLIGRAM(S): 20 TABLET, DELAYED RELEASE ORAL at 05:28

## 2020-01-28 RX ADMIN — Medication 20 MILLIGRAM(S): at 01:09

## 2020-01-28 RX ADMIN — Medication 650 MILLIGRAM(S): at 23:35

## 2020-01-28 RX ADMIN — Medication 2: at 07:37

## 2020-01-28 RX ADMIN — OXYCODONE HYDROCHLORIDE 10 MILLIGRAM(S): 5 TABLET ORAL at 11:25

## 2020-01-28 RX ADMIN — Medication 650 MILLIGRAM(S): at 22:29

## 2020-01-28 RX ADMIN — Medication 20 MILLIGRAM(S): at 17:09

## 2020-01-28 RX ADMIN — Medication 2: at 17:57

## 2020-01-28 RX ADMIN — OXYCODONE HYDROCHLORIDE 5 MILLIGRAM(S): 5 TABLET ORAL at 05:28

## 2020-01-28 RX ADMIN — ENOXAPARIN SODIUM 40 MILLIGRAM(S): 100 INJECTION SUBCUTANEOUS at 21:14

## 2020-01-28 RX ADMIN — OXYCODONE HYDROCHLORIDE 5 MILLIGRAM(S): 5 TABLET ORAL at 06:19

## 2020-01-28 RX ADMIN — Medication 4: at 12:02

## 2020-01-28 RX ADMIN — Medication 20 MILLIGRAM(S): at 17:58

## 2020-01-28 RX ADMIN — LOSARTAN POTASSIUM 25 MILLIGRAM(S): 100 TABLET, FILM COATED ORAL at 05:28

## 2020-01-28 RX ADMIN — Medication 2: at 21:34

## 2020-01-28 NOTE — PROGRESS NOTE ADULT - SUBJECTIVE AND OBJECTIVE BOX
Neurology Follow up note    Name  DRAKE WHITTAKER    HPI:  Pt is a 50 y/o M PMH HTN presents after fall and leg weakness x 1 day. Pt has had intermittent leg weakness for the past few weeks, but yesterday his knees "buckled" causing him to fall. The same thing happened to him again when he tried to stand up. He reports feeling "muscle spasm" in low back and intermittent numbness in b/l LEs. Pt denies fever, HA, vision changes, paresthesias, N/V, urinary/bowel incontinence, saddle anesthesia. Pt takes norvasc for HTN. (24 Jan 2020 19:16)      Interval History -  back pain and LE weakness        REVIEW OF SYSTEMS    Vital Signs Last 24 Hrs  T(C): 36.7 (28 Jan 2020 05:22), Max: 37 (27 Jan 2020 14:03)  T(F): 98.1 (28 Jan 2020 05:22), Max: 98.6 (27 Jan 2020 14:03)  HR: 87 (28 Jan 2020 05:22) (76 - 95)  BP: 155/72 (28 Jan 2020 05:22) (134/70 - 190/76)  BP(mean): 96 (28 Jan 2020 02:00) (96 - 96)  RR: 20 (28 Jan 2020 05:22) (16 - 25)  SpO2: 94% (28 Jan 2020 05:22) (94% - 98%)    Physical Exam-     Mental Status- awake and alert    Cranial Nerves- full EOM    Gait and station- n/a    Motor- no foot drop    Reflexes- decreased LE    Sensation- no sensory level    Coordination- no tremors    Vascular - no bruits    Medications  acetaminophen   Tablet .. 650 milliGRAM(s) Oral every 6 hours PRN  amLODIPine   Tablet 10 milliGRAM(s) Oral daily  dextrose 40% Gel 15 Gram(s) Oral once PRN  dextrose 5%. 1000 milliLiter(s) IV Continuous <Continuous>  dextrose 50% Injectable 12.5 Gram(s) IV Push once  diazepam    Tablet 5 milliGRAM(s) Oral every 6 hours PRN  enoxaparin Injectable 40 milliGRAM(s) SubCutaneous every 24 hours  glucagon  Injectable 1 milliGRAM(s) IntraMuscular once PRN  hydrALAZINE Injectable 20 milliGRAM(s) IV Push every 2 hours PRN  insulin lispro (HumaLOG) corrective regimen sliding scale   SubCutaneous Before meals and at bedtime  labetalol Injectable 20 milliGRAM(s) IV Push every 2 hours PRN  lactated ringers. 1000 milliLiter(s) IV Continuous <Continuous>  losartan 25 milliGRAM(s) Oral daily  ondansetron Injectable 4 milliGRAM(s) IV Push every 6 hours PRN  oxyCODONE    IR 5 milliGRAM(s) Oral every 4 hours PRN  oxyCODONE    IR 10 milliGRAM(s) Oral every 6 hours PRN  pantoprazole    Tablet 40 milliGRAM(s) Oral before breakfast  senna 2 Tablet(s) Oral at bedtime PRN      Lab      Radiology    Assessment- Lumbar radiculopathy    Plan as per NS

## 2020-01-28 NOTE — PROGRESS NOTE ADULT - SUBJECTIVE AND OBJECTIVE BOX
HPI:  Pt is a 48 y/o M PMH HTN presents after fall and leg weakness x 1 day. Pt has had intermittent leg weakness for the past few weeks, but yesterday his knees "buckled" causing him to fall. The same thing happened to him again when he tried to stand up. He reports feeling "muscle spasm" in low back and intermittent numbness in b/l LEs. Pt denies fever, HA, vision changes, paresthesias, N/V, urinary/bowel incontinence, saddle anesthesia. Pt takes norvasc for HTN. (24 Jan 2020 19:16)    OVERNIGHT EVENTS: CARLITOS overnight, neuro stable    Hospital Course:  1/26 POD# 0 S/P T11-T12 laminectomy  1/27 POD#1 CARLITOS overnight, pain is well controlled, HMV, Trend Cr (1.35), Cont LR @ 80cc/hr  1/28: POD2. CARLITOS overnight, neuro stable. Pain well controlled. HMV output 40cc overnight/88 past 24 hrs. Pending AR    Vital Signs Last 24 Hrs  T(C): 37.1 (28 Jan 2020 09:19), Max: 37.1 (28 Jan 2020 09:19)  T(F): 98.7 (28 Jan 2020 09:19), Max: 98.7 (28 Jan 2020 09:19)  HR: 87 (28 Jan 2020 09:19) (76 - 95)  BP: 167/82 (28 Jan 2020 09:19) (134/76 - 190/76)  BP(mean): 96 (28 Jan 2020 02:00) (96 - 96)  RR: 18 (28 Jan 2020 09:19) (16 - 25)  SpO2: 96% (28 Jan 2020 09:19) (94% - 98%)    I&O's Summary    27 Jan 2020 07:01  -  28 Jan 2020 07:00  --------------------------------------------------------  IN: 1525 mL / OUT: 1838 mL / NET: -313 mL    28 Jan 2020 07:01  -  28 Jan 2020 09:33  --------------------------------------------------------  IN: 300 mL / OUT: 500 mL / NET: -200 mL        PHYSICAL EXAM:  GEN: laying in bed, appears well, NAD  NEURO: AOx3. FC, OE spont, speech clear, face symmetric. CNII-XII intact. PERRL, EOMI. No drift. MAEx4. UE 5/5 b/l, LE 3/5 b/l. SILT  CV: RRR +s1/S2  PULM: CTAB  GI: Abd soft NT/ND  EXT: Ext warm, dry, nontender  WOUND: thoracic incision C/D/I    TUBES/LINES:  [] Andrade  [] Lumbar Drain  [x] Wound Drains HMVx1  [] Others      DIET:  [] NPO  [x] Mechanical  [] Tube feeds    LABS:                        11.6   11.78 )-----------( 258      ( 28 Jan 2020 06:28 )             35.7     01-28    135  |  99  |  31<H>  ----------------------------<  192<H>  3.8   |  24  |  1.17    Ca    9.0      28 Jan 2020 06:28  Phos  2.6     01-28  Mg     2.0     01-28              CAPILLARY BLOOD GLUCOSE      POCT Blood Glucose.: 171 mg/dL (28 Jan 2020 06:56)  POCT Blood Glucose.: 143 mg/dL (27 Jan 2020 21:33)  POCT Blood Glucose.: 173 mg/dL (27 Jan 2020 17:07)  POCT Blood Glucose.: 160 mg/dL (27 Jan 2020 11:46)      Drug Levels: [] N/A    CSF Analysis: [] N/A      Allergies    No Known Allergies    Intolerances      MEDICATIONS:  Antibiotics:    Neuro:  acetaminophen   Tablet .. 650 milliGRAM(s) Oral every 6 hours PRN  diazepam    Tablet 5 milliGRAM(s) Oral every 6 hours PRN  ondansetron Injectable 4 milliGRAM(s) IV Push every 6 hours PRN  oxyCODONE    IR 5 milliGRAM(s) Oral every 4 hours PRN  oxyCODONE    IR 10 milliGRAM(s) Oral every 6 hours PRN    Anticoagulation:  enoxaparin Injectable 40 milliGRAM(s) SubCutaneous every 24 hours    OTHER:  amLODIPine   Tablet 10 milliGRAM(s) Oral daily  dextrose 40% Gel 15 Gram(s) Oral once PRN  dextrose 50% Injectable 12.5 Gram(s) IV Push once  glucagon  Injectable 1 milliGRAM(s) IntraMuscular once PRN  hydrALAZINE Injectable 20 milliGRAM(s) IV Push every 2 hours PRN  insulin lispro (HumaLOG) corrective regimen sliding scale   SubCutaneous Before meals and at bedtime  labetalol Injectable 20 milliGRAM(s) IV Push every 2 hours PRN  losartan 25 milliGRAM(s) Oral daily  pantoprazole    Tablet 40 milliGRAM(s) Oral before breakfast  senna 2 Tablet(s) Oral at bedtime PRN    IVF:  dextrose 5%. 1000 milliLiter(s) IV Continuous <Continuous>  lactated ringers. 1000 milliLiter(s) IV Continuous <Continuous>    CULTURES:    RADIOLOGY & ADDITIONAL TESTS:      ASSESSMENT:  49yMale s/p T11-12 laminectomy POD#2    LEG WEAKNESS BILATERAL  No pertinent family history in first degree relatives  Handoff  MEWS Score  HTN (hypertension)  Leg weakness, bilateral  [place] (acute kidney injury)  Other elevated white blood cell (WBC) count  Postoperative state  Preop examination  Essential hypertension  Leg weakness, bilateral  No significant past surgical history  WEAKNESS      PLAN:  NEURO:  - neuro/vitals checks  - monitor HMV output  - pain control    CARDIOVASCULAR:  - normotensive SBP goal  - cont losartan, norvasc    PULMONARY:  - IS    RENAL:  - daily labs, repletions prn  - BUN/Cr downtrending    GI:  - regular diet  - bowel regimen  - protonix while on decadron    HEME:  - H/H stable  - SCDs, SQL    ID:  - afebrile    ENDO:  - ISS  - glucose goal 140-180    DVT PROPHYLAXIS:  [x] Venodynes                                [x] Heparin/Lovenox    DISPOSITION: pending AR. full code, d/w Dr. Mcgregor    Assessment:  Present when checked    []  GCS  E   V  M     Heart Failure: []Acute, [] acute on chronic , []chronic  Heart Failure:  [] Diastolic (HFpEF), [] Systolic (HFrEF), []Combined (HFpEF and HFrEF), [] RHF, [] Pulm HTN, [] Other    [] [place], [] ATN, [] AIN, [] other  [] CKD1, [] CKD2, [] CKD 3, [] CKD 4, [] CKD 5, []ESRD    Encephalopathy: [] Metabolic, [] Hepatic, [] toxic, [] Neurological, [] Other    Abnormal Nurtitional Status: [] malnurtition (see nutrition note), [ ]underweight: BMI < 19, [] morbid obesity: BMI >40, [] Cachexia    [] Sepsis  [] hypovolemic shock,[] cardiogenic shock, [] hemorrhagic shock, [] neuogenic shock  [] Acute Respiratory Failure  []Cerebral edema, [] Brain compression/ herniation,   [] Functional quadriplegia  [] Acute blood loss anemia HPI:  Pt is a 50 y/o M PMH HTN presents after fall and leg weakness x 1 day. Pt has had intermittent leg weakness for the past few weeks, but yesterday his knees "buckled" causing him to fall. The same thing happened to him again when he tried to stand up. He reports feeling "muscle spasm" in low back and intermittent numbness in b/l LEs. Pt denies fever, HA, vision changes, paresthesias, N/V, urinary/bowel incontinence, saddle anesthesia. Pt takes norvasc for HTN. (24 Jan 2020 19:16)    OVERNIGHT EVENTS: CARLITOS overnight, neuro stable    Hospital Course:  1/26 POD# 0 S/P T11-T12 laminectomy  1/27 POD#1 CARLITOS overnight, pain is well controlled, HMV, Trend Cr (1.35), Cont LR @ 80cc/hr  1/28: POD2. CARLITOS overnight, neuro stable. Pain well controlled. HMV output 40cc overnight/88 past 24 hrs. Pending AR    Vital Signs Last 24 Hrs  T(C): 37.1 (28 Jan 2020 09:19), Max: 37.1 (28 Jan 2020 09:19)  T(F): 98.7 (28 Jan 2020 09:19), Max: 98.7 (28 Jan 2020 09:19)  HR: 87 (28 Jan 2020 09:19) (76 - 95)  BP: 167/82 (28 Jan 2020 09:19) (134/76 - 190/76)  BP(mean): 96 (28 Jan 2020 02:00) (96 - 96)  RR: 18 (28 Jan 2020 09:19) (16 - 25)  SpO2: 96% (28 Jan 2020 09:19) (94% - 98%)    I&O's Summary    27 Jan 2020 07:01  -  28 Jan 2020 07:00  --------------------------------------------------------  IN: 1525 mL / OUT: 1838 mL / NET: -313 mL    28 Jan 2020 07:01  -  28 Jan 2020 09:33  --------------------------------------------------------  IN: 300 mL / OUT: 500 mL / NET: -200 mL        PHYSICAL EXAM:  GEN: laying in bed, appears well, NAD  NEURO: AOx3. FC, OE spont, speech clear, face symmetric. CNII-XII intact. PERRL, EOMI. No drift. MAEx4. UE 5/5 b/l, LE 3/5 b/l. SILT  CV: RRR +s1/S2  PULM: CTAB  GI: Abd soft NT/ND  EXT: Ext warm, dry, nontender  WOUND: thoracic incision C/D/I    TUBES/LINES:  [] Andrade  [] Lumbar Drain  [x] Wound Drains HMVx1  [] Others      DIET:  [] NPO  [x] Mechanical  [] Tube feeds    LABS:                        11.6   11.78 )-----------( 258      ( 28 Jan 2020 06:28 )             35.7     01-28    135  |  99  |  31<H>  ----------------------------<  192<H>  3.8   |  24  |  1.17    Ca    9.0      28 Jan 2020 06:28  Phos  2.6     01-28  Mg     2.0     01-28              CAPILLARY BLOOD GLUCOSE      POCT Blood Glucose.: 171 mg/dL (28 Jan 2020 06:56)  POCT Blood Glucose.: 143 mg/dL (27 Jan 2020 21:33)  POCT Blood Glucose.: 173 mg/dL (27 Jan 2020 17:07)  POCT Blood Glucose.: 160 mg/dL (27 Jan 2020 11:46)      Drug Levels: [] N/A    CSF Analysis: [] N/A      Allergies    No Known Allergies    Intolerances      MEDICATIONS:  Antibiotics:    Neuro:  acetaminophen   Tablet .. 650 milliGRAM(s) Oral every 6 hours PRN  diazepam    Tablet 5 milliGRAM(s) Oral every 6 hours PRN  ondansetron Injectable 4 milliGRAM(s) IV Push every 6 hours PRN  oxyCODONE    IR 5 milliGRAM(s) Oral every 4 hours PRN  oxyCODONE    IR 10 milliGRAM(s) Oral every 6 hours PRN    Anticoagulation:  enoxaparin Injectable 40 milliGRAM(s) SubCutaneous every 24 hours    OTHER:  amLODIPine   Tablet 10 milliGRAM(s) Oral daily  dextrose 40% Gel 15 Gram(s) Oral once PRN  dextrose 50% Injectable 12.5 Gram(s) IV Push once  glucagon  Injectable 1 milliGRAM(s) IntraMuscular once PRN  hydrALAZINE Injectable 20 milliGRAM(s) IV Push every 2 hours PRN  insulin lispro (HumaLOG) corrective regimen sliding scale   SubCutaneous Before meals and at bedtime  labetalol Injectable 20 milliGRAM(s) IV Push every 2 hours PRN  losartan 25 milliGRAM(s) Oral daily  pantoprazole    Tablet 40 milliGRAM(s) Oral before breakfast  senna 2 Tablet(s) Oral at bedtime PRN    IVF:  dextrose 5%. 1000 milliLiter(s) IV Continuous <Continuous>  lactated ringers. 1000 milliLiter(s) IV Continuous <Continuous>    CULTURES:    RADIOLOGY & ADDITIONAL TESTS:      ASSESSMENT:  49yMale s/p T11-12 laminectomy POD#2    LEG WEAKNESS BILATERAL  No pertinent family history in first degree relatives  Handoff  MEWS Score  HTN (hypertension)  Leg weakness, bilateral  [place] (acute kidney injury)  Other elevated white blood cell (WBC) count  Postoperative state  Preop examination  Essential hypertension  Leg weakness, bilateral  No significant past surgical history  WEAKNESS      PLAN:  NEURO:  - neuro/vitals checks  - monitor HMV output  - pain control    CARDIOVASCULAR:  - normotensive SBP goal  - cont losartan, norvasc    PULMONARY:  - IS    RENAL:  - daily labs, repletions prn  - BUN/Cr downtrending    GI:  - regular diet  - bowel regimen  - protonix    HEME:  - H/H stable  - SCDs, SQL    ID:  - afebrile    ENDO:  - ISS  - glucose goal 140-180    DVT PROPHYLAXIS:  [x] Venodynes                                [x] Heparin/Lovenox    DISPOSITION: pending AR. full code, d/w Dr. Mcgregor    Assessment:  Present when checked    []  GCS  E   V  M     Heart Failure: []Acute, [] acute on chronic , []chronic  Heart Failure:  [] Diastolic (HFpEF), [] Systolic (HFrEF), []Combined (HFpEF and HFrEF), [] RHF, [] Pulm HTN, [] Other    [] [place], [] ATN, [] AIN, [] other  [] CKD1, [] CKD2, [] CKD 3, [] CKD 4, [] CKD 5, []ESRD    Encephalopathy: [] Metabolic, [] Hepatic, [] toxic, [] Neurological, [] Other    Abnormal Nurtitional Status: [] malnurtition (see nutrition note), [ ]underweight: BMI < 19, [] morbid obesity: BMI >40, [] Cachexia    [] Sepsis  [] hypovolemic shock,[] cardiogenic shock, [] hemorrhagic shock, [] neuogenic shock  [] Acute Respiratory Failure  []Cerebral edema, [] Brain compression/ herniation,   [] Functional quadriplegia  [] Acute blood loss anemia

## 2020-01-28 NOTE — PROGRESS NOTE ADULT - SUBJECTIVE AND OBJECTIVE BOX
Interval Events: Reviewed  Patient seen and examined at bedside.    Patient is a 49y old  Male who presents with a chief complaint of Cord Compression (28 Jan 2020 09:33)  he is feeling better     PAST MEDICAL & SURGICAL HISTORY:  HTN (hypertension)  No significant past surgical history      MEDICATIONS:  Pulmonary:    Antimicrobials:    Anticoagulants:  enoxaparin Injectable 40 milliGRAM(s) SubCutaneous every 24 hours    Cardiac:  amLODIPine   Tablet 10 milliGRAM(s) Oral daily  hydrALAZINE Injectable 20 milliGRAM(s) IV Push every 2 hours PRN  losartan 25 milliGRAM(s) Oral daily      Allergies    No Known Allergies    Intolerances        Vital Signs Last 24 Hrs  T(C): 37 (28 Jan 2020 21:11), Max: 37.1 (28 Jan 2020 09:19)  T(F): 98.6 (28 Jan 2020 21:11), Max: 98.7 (28 Jan 2020 09:19)  HR: 79 (28 Jan 2020 21:11) (74 - 91)  BP: 117/59 (28 Jan 2020 21:11) (117/59 - 190/76)  BP(mean): 96 (28 Jan 2020 02:00) (96 - 96)  RR: 18 (28 Jan 2020 21:11) (16 - 20)  SpO2: 95% (28 Jan 2020 21:11) (94% - 98%)    01-27 @ 07:01 - 01-28 @ 07:00  --------------------------------------------------------  IN: 1525 mL / OUT: 1838 mL / NET: -313 mL    01-28 @ 07:01 - 01-28 @ 22:20  --------------------------------------------------------  IN: 1280 mL / OUT: 1160 mL / NET: 120 mL          Review of Systems:   •	General: negative  •	Skin/Breast: negative  •	Ophthalmologic: negative  •	ENMT: negative  •	Respiratory and Thorax: negative  •	Cardiovascular: negative  •	Gastrointestinal: negative  •	Genitourinary: negative  •	Musculoskeletal: negative  •	Neurological: negative  •	Psychiatric: negative  •	Hematology/Lymphatics: negative  •	Endocrine: negative  •	Allergic/Immunologic: negative    Physical Exam:   • Constitutional:	Well-developed, well nourished  • Eyes:	EOMI; PERRL; no drainage or redness  • ENMT:	No oral lesions; no gross abnormalities  • Neck	No bruits; no thyromegaly or nodules  • Breasts:	not examined  • Back:	No deformity or limitation of movement  • Respiratory:	Breath Sounds equal & clear to percussion & auscultation, no accessory muscle use  • Cardiovascular:	Regular rate & rhythm, normal S1, S2; no murmurs, gallops or rubs; no S3, S4  • Gastrointestinal:	Soft, non-tender, no hepatosplenomegaly, normal bowel sounds  • Genitourinary:	not examined  • Rectal: not examined  • Extremities:	No cyanosis, clubbing or edema  • Vascular:	Equal and normal pulses (carotid, femoral, dorsalis pedis)  • Neurologica:l	not examined  • Skin:	No lesions; no rash  • Lymph Nodes:	No lymphadedenopathy  • Musculoskeletal:	No joint pain, swelling or deformity; no limitation of movement        LABS:      CBC Full  -  ( 28 Jan 2020 06:28 )  WBC Count : 11.78 K/uL  RBC Count : 4.28 M/uL  Hemoglobin : 11.6 g/dL  Hematocrit : 35.7 %  Platelet Count - Automated : 258 K/uL  Mean Cell Volume : 83.4 fl  Mean Cell Hemoglobin : 27.1 pg  Mean Cell Hemoglobin Concentration : 32.5 gm/dL  Auto Neutrophil # : x  Auto Lymphocyte # : x  Auto Monocyte # : x  Auto Eosinophil # : x  Auto Basophil # : x  Auto Neutrophil % : x  Auto Lymphocyte % : x  Auto Monocyte % : x  Auto Eosinophil % : x  Auto Basophil % : x    01-28    135  |  99  |  31<H>  ----------------------------<  192<H>  3.8   |  24  |  1.17    Ca    9.0      28 Jan 2020 06:28  Phos  2.6     01-28  Mg     2.0     01-28                          RADIOLOGY & ADDITIONAL STUDIES (The following images were personally reviewed):  Andrade:                                     No  Urine output:                       adequate  DVT prophylaxis:                 Yes  Flattus:                                  Yes  Bowel movement:              No

## 2020-01-28 NOTE — CHART NOTE - NSCHARTNOTEFT_GEN_A_CORE
Upon Nutritional Assessment by the Registered Dietitian your patient was determined to meet criteria / has evidence of the following diagnosis/diagnoses:          [ ]  Mild Protein Calorie Malnutrition        [ ]  Moderate Protein Calorie Malnutrition        [ ] Severe Protein Calorie Malnutrition        [ ] Unspecified Protein Calorie Malnutrition        [ ] Underweight / BMI <19        [ x ] Morbid Obesity / BMI > 40    Findings as based on:  •  Comprehensive nutrition assessment and consultation  •  Calorie counts (nutrient intake analysis)  •  Food acceptance and intake status from observations by staff  •  Follow up  •  Patient education  •  Intervention secondary to interdisciplinary rounds  •   concerns    Treatment:    The following diet has been recommended:  1. Regular diet 2. Recommend change to CST CHO diet 3. RD diet reenforcement prn    PROVIDER Section:     By signing this assessment you are acknowledging and agree with the diagnosis/diagnoses assigned by the Registered Dietitian    Comments:

## 2020-01-28 NOTE — DIETITIAN INITIAL EVALUATION ADULT. - OTHER INFO
49M with hx of HTN presenting after fall and LBP, found to have T11-12, T2-3 disc bulge with cord compression on MRI s/p T11-T12 Laminectomy (1/26), now POD #2. On assessment, pt resting in bed without complaint. Currently on regular diet tolerating PO well. Pt consuming 50-75% of meals stating appetite slightly reduced after surgery but slowly returning to baseline. Pt aware of high blood sugars but appears unaware of new onset DM- disc with team who will discuss dx with pt and RD to follow up and provide appropriate education. Education was provided on general healthy diet with emphasis on lean protein to aid with wound healing and weight loss tips- pt appears receptive to edu. No changes in appetite PTA. Noted good appetite. NKFA. No changes in UBW. GI: WDL- on senna awaiting BM. Skin: Surgical incision, kaylin score 19. Pain: 7/10 back pain, well controlled on assessment. Please see nutrition recommendations below. RD to follow up per protocol.

## 2020-01-28 NOTE — PROGRESS NOTE ADULT - PROBLEM SELECTOR PLAN 5
IV fluids and follow Cr.  Most likely related to surgery abd his body mass.  Avoid nephrotoxic drugs  Cr is normal

## 2020-01-28 NOTE — DIETITIAN INITIAL EVALUATION ADULT. - ENERGY NEEDS
Height: 72" Weight: 380lbs, IBW 178lbs+/-10%, %%, BMI 51.5 kg/m2  IBW used for calculations as pt >120% of IBW. Needs adjusted for post-op.

## 2020-01-29 DIAGNOSIS — R73.09 OTHER ABNORMAL GLUCOSE: ICD-10-CM

## 2020-01-29 LAB
ANION GAP SERPL CALC-SCNC: 11 MMOL/L — SIGNIFICANT CHANGE UP (ref 5–17)
BUN SERPL-MCNC: 31 MG/DL — HIGH (ref 7–23)
CALCIUM SERPL-MCNC: 8.8 MG/DL — SIGNIFICANT CHANGE UP (ref 8.4–10.5)
CHLORIDE SERPL-SCNC: 101 MMOL/L — SIGNIFICANT CHANGE UP (ref 96–108)
CO2 SERPL-SCNC: 25 MMOL/L — SIGNIFICANT CHANGE UP (ref 22–31)
CREAT SERPL-MCNC: 1.15 MG/DL — SIGNIFICANT CHANGE UP (ref 0.5–1.3)
GLUCOSE BLDC GLUCOMTR-MCNC: 134 MG/DL — HIGH (ref 70–99)
GLUCOSE BLDC GLUCOMTR-MCNC: 164 MG/DL — HIGH (ref 70–99)
GLUCOSE BLDC GLUCOMTR-MCNC: 166 MG/DL — HIGH (ref 70–99)
GLUCOSE BLDC GLUCOMTR-MCNC: 172 MG/DL — HIGH (ref 70–99)
GLUCOSE SERPL-MCNC: 166 MG/DL — HIGH (ref 70–99)
HCT VFR BLD CALC: 37.4 % — LOW (ref 39–50)
HGB BLD-MCNC: 12 G/DL — LOW (ref 13–17)
MCHC RBC-ENTMCNC: 26.9 PG — LOW (ref 27–34)
MCHC RBC-ENTMCNC: 32.1 GM/DL — SIGNIFICANT CHANGE UP (ref 32–36)
MCV RBC AUTO: 83.9 FL — SIGNIFICANT CHANGE UP (ref 80–100)
NRBC # BLD: 0 /100 WBCS — SIGNIFICANT CHANGE UP (ref 0–0)
PLATELET # BLD AUTO: 227 K/UL — SIGNIFICANT CHANGE UP (ref 150–400)
POTASSIUM SERPL-MCNC: 4 MMOL/L — SIGNIFICANT CHANGE UP (ref 3.5–5.3)
POTASSIUM SERPL-SCNC: 4 MMOL/L — SIGNIFICANT CHANGE UP (ref 3.5–5.3)
RBC # BLD: 4.46 M/UL — SIGNIFICANT CHANGE UP (ref 4.2–5.8)
RBC # FLD: 14.2 % — SIGNIFICANT CHANGE UP (ref 10.3–14.5)
SODIUM SERPL-SCNC: 137 MMOL/L — SIGNIFICANT CHANGE UP (ref 135–145)
WBC # BLD: 10.59 K/UL — HIGH (ref 3.8–10.5)
WBC # FLD AUTO: 10.59 K/UL — HIGH (ref 3.8–10.5)

## 2020-01-29 PROCEDURE — 99232 SBSQ HOSP IP/OBS MODERATE 35: CPT

## 2020-01-29 PROCEDURE — 99232 SBSQ HOSP IP/OBS MODERATE 35: CPT | Mod: GC

## 2020-01-29 RX ADMIN — Medication 650 MILLIGRAM(S): at 21:43

## 2020-01-29 RX ADMIN — LOSARTAN POTASSIUM 25 MILLIGRAM(S): 100 TABLET, FILM COATED ORAL at 05:32

## 2020-01-29 RX ADMIN — ENOXAPARIN SODIUM 40 MILLIGRAM(S): 100 INJECTION SUBCUTANEOUS at 22:28

## 2020-01-29 RX ADMIN — Medication 2: at 07:23

## 2020-01-29 RX ADMIN — PANTOPRAZOLE SODIUM 40 MILLIGRAM(S): 20 TABLET, DELAYED RELEASE ORAL at 05:32

## 2020-01-29 RX ADMIN — OXYCODONE HYDROCHLORIDE 10 MILLIGRAM(S): 5 TABLET ORAL at 14:05

## 2020-01-29 RX ADMIN — AMLODIPINE BESYLATE 10 MILLIGRAM(S): 2.5 TABLET ORAL at 05:32

## 2020-01-29 RX ADMIN — Medication 2: at 22:29

## 2020-01-29 RX ADMIN — OXYCODONE HYDROCHLORIDE 10 MILLIGRAM(S): 5 TABLET ORAL at 14:35

## 2020-01-29 NOTE — PROGRESS NOTE ADULT - SUBJECTIVE AND OBJECTIVE BOX
HPI:  Pt is a 50 y/o M PMH HTN presents after fall and leg weakness x 1 day. Pt has had intermittent leg weakness for the past few weeks, but yesterday his knees "buckled" causing him to fall. The same thing happened to him again when he tried to stand up. He reports feeling "muscle spasm" in low back and intermittent numbness in b/l LEs. Pt denies fever, HA, vision changes, paresthesias, N/V, urinary/bowel incontinence, saddle anesthesia. Pt takes norvasc for HTN. (24 Jan 2020 19:16)    Hospital Course:  1/26 POD# 0 S/P T11-T12 laminectomy  1/27 POD#1 CARLITOS overnight, pain is well controlled, HMV, Trend Cr (1.35), Cont LR @ 80cc/hr  1/28: POD2. CARLITOS overnight, neuro stable. Pain well controlled. HMV output 40cc overnight/88 past 24 hrs. Pending AR  1/29: POD#3 CARLITOS, losartan added BP control  OVERNIGHT EVENTS: no major event overnight.  Vital Signs Last 24 Hrs  T(C): 36.9 (29 Jan 2020 00:14), Max: 37.1 (28 Jan 2020 09:19)  T(F): 98.5 (29 Jan 2020 00:14), Max: 98.7 (28 Jan 2020 09:19)  HR: 89 (29 Jan 2020 00:14) (74 - 89)  BP: 166/82 (29 Jan 2020 00:14) (117/59 - 178/79)  BP(mean): --  RR: 20 (29 Jan 2020 00:14) (18 - 20)  SpO2: 94% (29 Jan 2020 00:14) (94% - 97%)    I&O's Summary    27 Jan 2020 07:01  -  28 Jan 2020 07:00  --------------------------------------------------------  IN: 1525 mL / OUT: 1838 mL / NET: -313 mL    28 Jan 2020 07:01  -  29 Jan 2020 02:38  --------------------------------------------------------  IN: 1280 mL / OUT: 1740 mL / NET: -460 mL        PHYSICAL EXAM:    GEN: laying in bed, appears well, NAD  NEURO: AOx3. FC, OE spont, speech clear, face symmetric. CNII-XII intact. PERRL, EOMI. No drift. MAEx4. UE 5/5 b/l, LE 3/5 b/l. SILT  CV: RRR +s1/S2  PULM: CTAB  GI: Abd soft NT/ND  EXT: Ext warm, dry, nontender  WOUND: thoracic incision C/D/I    DIET: diabetic    LABS:                        11.6   11.78 )-----------( 258      ( 28 Jan 2020 06:28 )             35.7     01-28    135  |  99  |  31<H>  ----------------------------<  192<H>  3.8   |  24  |  1.17    Ca    9.0      28 Jan 2020 06:28  Phos  2.6     01-28  Mg     2.0     01-28              CAPILLARY BLOOD GLUCOSE      POCT Blood Glucose.: 169 mg/dL (28 Jan 2020 21:26)  POCT Blood Glucose.: 162 mg/dL (28 Jan 2020 17:32)  POCT Blood Glucose.: 204 mg/dL (28 Jan 2020 11:49)  POCT Blood Glucose.: 171 mg/dL (28 Jan 2020 06:56)      Drug Levels: [] N/A    CSF Analysis: [] N/A      Allergies    No Known Allergies    Intolerances      MEDICATIONS:  Antibiotics:    Neuro:  acetaminophen   Tablet .. 650 milliGRAM(s) Oral every 6 hours PRN  diazepam    Tablet 5 milliGRAM(s) Oral every 6 hours PRN  ondansetron Injectable 4 milliGRAM(s) IV Push every 6 hours PRN  oxyCODONE    IR 5 milliGRAM(s) Oral every 4 hours PRN  oxyCODONE    IR 10 milliGRAM(s) Oral every 6 hours PRN    Anticoagulation:  enoxaparin Injectable 40 milliGRAM(s) SubCutaneous every 24 hours    OTHER:  amLODIPine   Tablet 10 milliGRAM(s) Oral daily  dextrose 40% Gel 15 Gram(s) Oral once PRN  dextrose 50% Injectable 12.5 Gram(s) IV Push once  glucagon  Injectable 1 milliGRAM(s) IntraMuscular once PRN  hydrALAZINE Injectable 20 milliGRAM(s) IV Push every 2 hours PRN  insulin lispro (HumaLOG) corrective regimen sliding scale   SubCutaneous Before meals and at bedtime  losartan 25 milliGRAM(s) Oral daily  pantoprazole    Tablet 40 milliGRAM(s) Oral before breakfast  senna 2 Tablet(s) Oral at bedtime PRN    IVF:  dextrose 5%. 1000 milliLiter(s) IV Continuous <Continuous>  lactated ringers. 1000 milliLiter(s) IV Continuous <Continuous>    CULTURES:    RADIOLOGY & ADDITIONAL TESTS:      ASSESSMENT:  49y Male s/p T11-12 laminectomy POD#3.    LEG WEAKNESS BILATERAL  No pertinent family history in first degree relatives  Handoff  MEWS Score  HTN (hypertension)  Leg weakness, bilateral  [place] (acute kidney injury)  Other elevated white blood cell (WBC) count  Postoperative state  Preop examination  Essential hypertension  Leg weakness, bilateral  No significant past surgical history  WEAKNESS    PLAN:  _ BP control, avoid IV meds  -Glycemic control  -Remove hemovac  _ pending acute rehab.    DVT PROPHYLAXIS:  [] Venodynes                                [] Heparin/Lovenox    DISPOSITION:    Assessment:  Present when checked    []  GCS  E   V  M     Heart Failure: []Acute, [] acute on chronic , []chronic  Heart Failure:  [] Diastolic (HFpEF), [] Systolic (HFrEF), []Combined (HFpEF and HFrEF), [] RHF, [] Pulm HTN, [] Other    [] [place], [] ATN, [] AIN, [] other  [] CKD1, [] CKD2, [] CKD 3, [] CKD 4, [] CKD 5, []ESRD    Encephalopathy: [] Metabolic, [] Hepatic, [] toxic, [] Neurological, [] Other    Abnormal Nurtitional Status: [] malnurtition (see nutrition note), [ ]underweight: BMI < 19, [] morbid obesity: BMI >40, [] Cachexia    [] Sepsis  [] hypovolemic shock,[] cardiogenic shock, [] hemorrhagic shock, [] neuogenic shock  [] Acute Respiratory Failure  []Cerebral edema, [] Brain compression/ herniation,   [] Functional quadriplegia  [] Acute blood loss anemia

## 2020-01-29 NOTE — PROGRESS NOTE ADULT - SUBJECTIVE AND OBJECTIVE BOX
Neurology Follow up note    Name  DRAKE WHITTAKER    HPI:  Pt is a 50 y/o M PMH HTN presents after fall and leg weakness x 1 day. Pt has had intermittent leg weakness for the past few weeks, but yesterday his knees "buckled" causing him to fall. The same thing happened to him again when he tried to stand up. He reports feeling "muscle spasm" in low back and intermittent numbness in b/l LEs. Pt denies fever, HA, vision changes, paresthesias, N/V, urinary/bowel incontinence, saddle anesthesia. Pt takes norvasc for HTN. (24 Jan 2020 19:16)      Interval History - no change in neuro status - back pain and some discomfort in the LE         REVIEW OF SYSTEMS    Vital Signs Last 24 Hrs  T(C): 36.3 (29 Jan 2020 04:45), Max: 37.1 (28 Jan 2020 09:19)  T(F): 97.3 (29 Jan 2020 04:45), Max: 98.7 (28 Jan 2020 09:19)  HR: 91 (29 Jan 2020 04:45) (74 - 91)  BP: 165/76 (29 Jan 2020 04:45) (117/59 - 178/79)  BP(mean): --  RR: 21 (29 Jan 2020 04:45) (18 - 21)  SpO2: 96% (29 Jan 2020 04:45) (94% - 97%)    Physical Exam-     Mental Status- awake and alert    Cranial Nerves- full EOM    Gait and station- n/a    Motor- moves all 4 extremities    Reflexes- decreased    Sensation- no sensory level    Coordination- no tremors    Vascular - no bruits    Medications  acetaminophen   Tablet .. 650 milliGRAM(s) Oral every 6 hours PRN  amLODIPine   Tablet 10 milliGRAM(s) Oral daily  dextrose 40% Gel 15 Gram(s) Oral once PRN  dextrose 5%. 1000 milliLiter(s) IV Continuous <Continuous>  dextrose 50% Injectable 12.5 Gram(s) IV Push once  diazepam    Tablet 5 milliGRAM(s) Oral every 6 hours PRN  enoxaparin Injectable 40 milliGRAM(s) SubCutaneous every 24 hours  glucagon  Injectable 1 milliGRAM(s) IntraMuscular once PRN  hydrALAZINE Injectable 20 milliGRAM(s) IV Push every 2 hours PRN  insulin lispro (HumaLOG) corrective regimen sliding scale   SubCutaneous Before meals and at bedtime  lactated ringers. 1000 milliLiter(s) IV Continuous <Continuous>  losartan 25 milliGRAM(s) Oral daily  ondansetron Injectable 4 milliGRAM(s) IV Push every 6 hours PRN  oxyCODONE    IR 5 milliGRAM(s) Oral every 4 hours PRN  oxyCODONE    IR 10 milliGRAM(s) Oral every 6 hours PRN  pantoprazole    Tablet 40 milliGRAM(s) Oral before breakfast  senna 2 Tablet(s) Oral at bedtime PRN      Lab      Radiology    Assessment- Lumbar radiculopathy    Plan as per ortho

## 2020-01-29 NOTE — PROVIDER CONTACT NOTE (OTHER) - SITUATION
Pt /82, HR 89. Pt resting comfortably, asymptomatic. Pain well managed.   PRN order for hydralazine 20mg IVP for SBP>160. Pt refused medication.

## 2020-01-29 NOTE — PROGRESS NOTE ADULT - SUBJECTIVE AND OBJECTIVE BOX
Interval Events: Reviewed  Patient seen and examined at bedside.    Patient is a 49y old  Male who presents with a chief complaint of Cord Compression (29 Jan 2020 07:33)  he is doing better but no bM yest    PAST MEDICAL & SURGICAL HISTORY:  HTN (hypertension)  No significant past surgical history      MEDICATIONS:  Pulmonary:    Antimicrobials:    Anticoagulants:  enoxaparin Injectable 40 milliGRAM(s) SubCutaneous every 24 hours    Cardiac:  amLODIPine   Tablet 10 milliGRAM(s) Oral daily  hydrALAZINE Injectable 20 milliGRAM(s) IV Push every 2 hours PRN  losartan 25 milliGRAM(s) Oral daily      Allergies    No Known Allergies    Intolerances        Vital Signs Last 24 Hrs  T(C): 36.5 (29 Jan 2020 10:17), Max: 37.1 (28 Jan 2020 14:48)  T(F): 97.7 (29 Jan 2020 10:17), Max: 98.7 (28 Jan 2020 14:48)  HR: 85 (29 Jan 2020 10:17) (74 - 91)  BP: 146/82 (29 Jan 2020 10:17) (117/59 - 178/79)  BP(mean): --  RR: 20 (29 Jan 2020 10:17) (18 - 21)  SpO2: 97% (29 Jan 2020 10:17) (94% - 97%)    01-28 @ 07:01  -  01-29 @ 07:00  --------------------------------------------------------  IN: 1280 mL / OUT: 1955 mL / NET: -675 mL          Review of Systems:   •	General: negative  •	Skin/Breast: negative  •	Ophthalmologic: negative  •	ENMT: negative  •	Respiratory and Thorax: negative  •	Cardiovascular: negative  •	Gastrointestinal: negative  •	Genitourinary: negative  •	Musculoskeletal: negative  •	Neurological: negative  •	Psychiatric: negative  •	Hematology/Lymphatics: negative  •	Endocrine: negative  •	Allergic/Immunologic: negative    Physical Exam:   • Constitutional:	Well-developed, well nourished  • Eyes:	EOMI; PERRL; no drainage or redness  • ENMT:	No oral lesions; no gross abnormalities  • Neck	No bruits; no thyromegaly or nodules  • Breasts:	not examined  • Back:	No deformity or limitation of movement  • Respiratory:	Breath Sounds equal & clear to percussion & auscultation, no accessory muscle use  • Cardiovascular:	Regular rate & rhythm, normal S1, S2; no murmurs, gallops or rubs; no S3, S4  • Gastrointestinal:	Soft, non-tender, no hepatosplenomegaly, normal bowel sounds  • Genitourinary:	not examined  • Rectal: not examined  • Extremities:	No cyanosis, clubbing or edema  • Vascular:	Equal and normal pulses (carotid, femoral, dorsalis pedis)  • Neurologica:l	not examined  • Skin:	No lesions; no rash  • Lymph Nodes:	No lymphadedenopathy  • Musculoskeletal:	No joint pain, swelling or deformity; no limitation of movement        LABS:      CBC Full  -  ( 29 Jan 2020 07:33 )  WBC Count : 10.59 K/uL  RBC Count : 4.46 M/uL  Hemoglobin : 12.0 g/dL  Hematocrit : 37.4 %  Platelet Count - Automated : 227 K/uL  Mean Cell Volume : 83.9 fl  Mean Cell Hemoglobin : 26.9 pg  Mean Cell Hemoglobin Concentration : 32.1 gm/dL  Auto Neutrophil # : x  Auto Lymphocyte # : x  Auto Monocyte # : x  Auto Eosinophil # : x  Auto Basophil # : x  Auto Neutrophil % : x  Auto Lymphocyte % : x  Auto Monocyte % : x  Auto Eosinophil % : x  Auto Basophil % : x    01-29    137  |  101  |  31<H>  ----------------------------<  166<H>  4.0   |  25  |  1.15    Ca    8.8      29 Jan 2020 07:33  Phos  2.6     01-28  Mg     2.0     01-28                          RADIOLOGY & ADDITIONAL STUDIES (The following images were personally reviewed):  Andrade:                                     No  Urine output:                       adequate  DVT prophylaxis:                 Yes  Flattus:                                  Yes  Bowel movement:              No

## 2020-01-29 NOTE — PROGRESS NOTE ADULT - PROBLEM SELECTOR PLAN 2
BP is elevated and started the patient on Losartan.  BP is better controlled and DC PRN labetolol and continue hydralazine avoid rapid drop in BP.  BP is better controlled and no need fir escalation of doses

## 2020-01-29 NOTE — PROGRESS NOTE ADULT - PROBLEM SELECTOR PLAN 6
he is on insulin coverage maintain Hb 140-180 and not below 70. He was informed that he is prediabetic and need to follow with PMD and may benefit from metformin

## 2020-01-30 LAB
ANION GAP SERPL CALC-SCNC: 12 MMOL/L — SIGNIFICANT CHANGE UP (ref 5–17)
BUN SERPL-MCNC: 29 MG/DL — HIGH (ref 7–23)
CALCIUM SERPL-MCNC: 8.6 MG/DL — SIGNIFICANT CHANGE UP (ref 8.4–10.5)
CHLORIDE SERPL-SCNC: 102 MMOL/L — SIGNIFICANT CHANGE UP (ref 96–108)
CO2 SERPL-SCNC: 24 MMOL/L — SIGNIFICANT CHANGE UP (ref 22–31)
CREAT SERPL-MCNC: 1.11 MG/DL — SIGNIFICANT CHANGE UP (ref 0.5–1.3)
GLUCOSE BLDC GLUCOMTR-MCNC: 153 MG/DL — HIGH (ref 70–99)
GLUCOSE BLDC GLUCOMTR-MCNC: 159 MG/DL — HIGH (ref 70–99)
GLUCOSE BLDC GLUCOMTR-MCNC: 169 MG/DL — HIGH (ref 70–99)
GLUCOSE BLDC GLUCOMTR-MCNC: 189 MG/DL — HIGH (ref 70–99)
GLUCOSE SERPL-MCNC: 170 MG/DL — HIGH (ref 70–99)
HCT VFR BLD CALC: 35.9 % — LOW (ref 39–50)
HGB BLD-MCNC: 11.5 G/DL — LOW (ref 13–17)
MCHC RBC-ENTMCNC: 26.9 PG — LOW (ref 27–34)
MCHC RBC-ENTMCNC: 32 GM/DL — SIGNIFICANT CHANGE UP (ref 32–36)
MCV RBC AUTO: 83.9 FL — SIGNIFICANT CHANGE UP (ref 80–100)
NRBC # BLD: 0 /100 WBCS — SIGNIFICANT CHANGE UP (ref 0–0)
PLATELET # BLD AUTO: 221 K/UL — SIGNIFICANT CHANGE UP (ref 150–400)
POTASSIUM SERPL-MCNC: 3.9 MMOL/L — SIGNIFICANT CHANGE UP (ref 3.5–5.3)
POTASSIUM SERPL-SCNC: 3.9 MMOL/L — SIGNIFICANT CHANGE UP (ref 3.5–5.3)
RBC # BLD: 4.28 M/UL — SIGNIFICANT CHANGE UP (ref 4.2–5.8)
RBC # FLD: 14.2 % — SIGNIFICANT CHANGE UP (ref 10.3–14.5)
SODIUM SERPL-SCNC: 138 MMOL/L — SIGNIFICANT CHANGE UP (ref 135–145)
WBC # BLD: 12.31 K/UL — HIGH (ref 3.8–10.5)
WBC # FLD AUTO: 12.31 K/UL — HIGH (ref 3.8–10.5)

## 2020-01-30 PROCEDURE — 99232 SBSQ HOSP IP/OBS MODERATE 35: CPT | Mod: GC

## 2020-01-30 PROCEDURE — 99232 SBSQ HOSP IP/OBS MODERATE 35: CPT

## 2020-01-30 PROCEDURE — 93970 EXTREMITY STUDY: CPT | Mod: 26

## 2020-01-30 RX ORDER — DIAZEPAM 5 MG
5 TABLET ORAL EVERY 6 HOURS
Refills: 0 | Status: DISCONTINUED | OUTPATIENT
Start: 2020-01-30 | End: 2020-02-01

## 2020-01-30 RX ORDER — LOSARTAN POTASSIUM 100 MG/1
50 TABLET, FILM COATED ORAL DAILY
Refills: 0 | Status: DISCONTINUED | OUTPATIENT
Start: 2020-01-30 | End: 2020-02-01

## 2020-01-30 RX ORDER — OXYCODONE HYDROCHLORIDE 5 MG/1
5 TABLET ORAL EVERY 4 HOURS
Refills: 0 | Status: DISCONTINUED | OUTPATIENT
Start: 2020-01-30 | End: 2020-02-01

## 2020-01-30 RX ADMIN — AMLODIPINE BESYLATE 10 MILLIGRAM(S): 2.5 TABLET ORAL at 06:36

## 2020-01-30 RX ADMIN — Medication 2: at 22:26

## 2020-01-30 RX ADMIN — Medication 2: at 12:53

## 2020-01-30 RX ADMIN — OXYCODONE HYDROCHLORIDE 10 MILLIGRAM(S): 5 TABLET ORAL at 18:00

## 2020-01-30 RX ADMIN — Medication 20 MILLIGRAM(S): at 01:30

## 2020-01-30 RX ADMIN — PANTOPRAZOLE SODIUM 40 MILLIGRAM(S): 20 TABLET, DELAYED RELEASE ORAL at 06:37

## 2020-01-30 RX ADMIN — OXYCODONE HYDROCHLORIDE 10 MILLIGRAM(S): 5 TABLET ORAL at 17:11

## 2020-01-30 RX ADMIN — Medication 2: at 07:39

## 2020-01-30 RX ADMIN — Medication 2: at 18:17

## 2020-01-30 RX ADMIN — LOSARTAN POTASSIUM 25 MILLIGRAM(S): 100 TABLET, FILM COATED ORAL at 06:36

## 2020-01-30 RX ADMIN — ENOXAPARIN SODIUM 40 MILLIGRAM(S): 100 INJECTION SUBCUTANEOUS at 21:24

## 2020-01-30 NOTE — PROGRESS NOTE ADULT - SUBJECTIVE AND OBJECTIVE BOX
Neurology Follow up note    Name  DRAKE WHITTAKER    HPI:  Pt is a 48 y/o M PMH HTN presents after fall and leg weakness x 1 day. Pt has had intermittent leg weakness for the past few weeks, but yesterday his knees "buckled" causing him to fall. The same thing happened to him again when he tried to stand up. He reports feeling "muscle spasm" in low back and intermittent numbness in b/l LEs. Pt denies fever, HA, vision changes, paresthesias, N/V, urinary/bowel incontinence, saddle anesthesia. Pt takes norvasc for HTN. (24 Jan 2020 19:16)      Interval History - reports weakness in the LE - no new tremors - no new back pain        REVIEW OF SYSTEMS    Vital Signs Last 24 Hrs  T(C): 36.7 (30 Jan 2020 05:00), Max: 36.7 (29 Jan 2020 17:10)  T(F): 98 (30 Jan 2020 05:00), Max: 98 (29 Jan 2020 17:10)  HR: 81 (30 Jan 2020 03:27) (77 - 94)  BP: 163/75 (30 Jan 2020 03:27) (146/82 - 177/89)  BP(mean): --  RR: 20 (30 Jan 2020 03:27) (18 - 20)  SpO2: 95% (30 Jan 2020 03:27) (95% - 97%)    Physical Exam-     Mental Status- awake and alert    Cranial Nerves- full EOM    Gait and station- n/a    Motor- moves all 4 extremities    Reflexes- decreased    Sensation- no sensory level    Coordination- no tremors    Vascular - no bruits    Medications  acetaminophen   Tablet .. 650 milliGRAM(s) Oral every 6 hours PRN  amLODIPine   Tablet 10 milliGRAM(s) Oral daily  dextrose 40% Gel 15 Gram(s) Oral once PRN  dextrose 5%. 1000 milliLiter(s) IV Continuous <Continuous>  dextrose 50% Injectable 12.5 Gram(s) IV Push once  diazepam    Tablet 5 milliGRAM(s) Oral every 6 hours PRN  enoxaparin Injectable 40 milliGRAM(s) SubCutaneous every 24 hours  glucagon  Injectable 1 milliGRAM(s) IntraMuscular once PRN  hydrALAZINE Injectable 20 milliGRAM(s) IV Push every 2 hours PRN  insulin lispro (HumaLOG) corrective regimen sliding scale   SubCutaneous Before meals and at bedtime  lactated ringers. 1000 milliLiter(s) IV Continuous <Continuous>  losartan 25 milliGRAM(s) Oral daily  ondansetron Injectable 4 milliGRAM(s) IV Push every 6 hours PRN  oxyCODONE    IR 5 milliGRAM(s) Oral every 4 hours PRN  oxyCODONE    IR 10 milliGRAM(s) Oral every 6 hours PRN  pantoprazole    Tablet 40 milliGRAM(s) Oral before breakfast  senna 2 Tablet(s) Oral at bedtime PRN      Lab      Radiology    Assessment- Lumbar radiculopathy    Plan as per NS

## 2020-01-30 NOTE — PROGRESS NOTE ADULT - SUBJECTIVE AND OBJECTIVE BOX
HPI:  Pt is a 50 y/o M PMH HTN presents after fall and leg weakness x 1 day. Pt has had intermittent leg weakness for the past few weeks, but yesterday his knees "buckled" causing him to fall. The same thing happened to him again when he tried to stand up. He reports feeling "muscle spasm" in low back and intermittent numbness in b/l LEs. Pt denies fever, HA, vision changes, paresthesias, N/V, urinary/bowel incontinence, saddle anesthesia. Pt takes norvasc for HTN. (24 Jan 2020 19:16)    OVERNIGHT EVENTS: CARLITOS overnight, neuro stable    Hospital Course:  1/26 POD# 0 S/P T11-T12 laminectomy  1/27 POD#1 CARLITOS overnight, pain is well controlled, HMV, Trend Cr (1.35), Cont LR @ 80cc/hr  1/28: POD2. CARLITOS overnight, neuro stable. Pain well controlled. HMV output 40cc overnight/88 past 24 hrs. Pending AR  1/29: POD#3 CARLITOS, losartan added BP control  1/30: POD4 CARLITOS overnight, neuro stable. pending AR    Vital Signs Last 24 Hrs  T(C): 36.7 (29 Jan 2020 17:10), Max: 36.7 (29 Jan 2020 17:10)  T(F): 98 (29 Jan 2020 17:10), Max: 98 (29 Jan 2020 17:10)  HR: 82 (30 Jan 2020 01:30) (77 - 94)  BP: 173/78 (30 Jan 2020 01:30) (146/82 - 177/89)  BP(mean): --  RR: 19 (30 Jan 2020 01:30) (18 - 21)  SpO2: 96% (30 Jan 2020 01:30) (96% - 97%)    I&O's Summary    28 Jan 2020 07:01  -  29 Jan 2020 07:00  --------------------------------------------------------  IN: 1280 mL / OUT: 1955 mL / NET: -675 mL    29 Jan 2020 07:01  -  30 Jan 2020 02:09  --------------------------------------------------------  IN: 0 mL / OUT: 900 mL / NET: -900 mL        PHYSICAL EXAM:  GEN: laying in bed, appears well, NAD  NEURO: AOx3. FC, OE spont, speech clear, face symmetric. CNII-XII intact, PERRL, EOMI. No drift. MAEx4. RUE/LUE 5/5 strength. RLE/LLE 3/5 b/l. SILT  CV: RRR +S1/S2  PULM: CTAB  GI: Abd obese, NT/ND  EXT: Ext warm, dry, nontender  WOUND: thoracic incision C/D/I    TUBES/LINES:  [] Andrade  [] Lumbar Drain  [] Wound Drains  [] Others      DIET:  [] NPO  [x] Mechanical  [] Tube feeds    LABS:                        12.0   10.59 )-----------( 227      ( 29 Jan 2020 07:33 )             37.4     01-29    137  |  101  |  31<H>  ----------------------------<  166<H>  4.0   |  25  |  1.15    Ca    8.8      29 Jan 2020 07:33  Phos  2.6     01-28  Mg     2.0     01-28              CAPILLARY BLOOD GLUCOSE      POCT Blood Glucose.: 172 mg/dL (29 Jan 2020 21:52)  POCT Blood Glucose.: 134 mg/dL (29 Jan 2020 17:36)  POCT Blood Glucose.: 166 mg/dL (29 Jan 2020 14:35)  POCT Blood Glucose.: 164 mg/dL (29 Jan 2020 07:04)      Drug Levels: [] N/A    CSF Analysis: [] N/A      Allergies    No Known Allergies    Intolerances      MEDICATIONS:  Antibiotics:    Neuro:  acetaminophen   Tablet .. 650 milliGRAM(s) Oral every 6 hours PRN  diazepam    Tablet 5 milliGRAM(s) Oral every 6 hours PRN  ondansetron Injectable 4 milliGRAM(s) IV Push every 6 hours PRN  oxyCODONE    IR 5 milliGRAM(s) Oral every 4 hours PRN  oxyCODONE    IR 10 milliGRAM(s) Oral every 6 hours PRN    Anticoagulation:  enoxaparin Injectable 40 milliGRAM(s) SubCutaneous every 24 hours    OTHER:  amLODIPine   Tablet 10 milliGRAM(s) Oral daily  dextrose 40% Gel 15 Gram(s) Oral once PRN  dextrose 50% Injectable 12.5 Gram(s) IV Push once  glucagon  Injectable 1 milliGRAM(s) IntraMuscular once PRN  hydrALAZINE Injectable 20 milliGRAM(s) IV Push every 2 hours PRN  insulin lispro (HumaLOG) corrective regimen sliding scale   SubCutaneous Before meals and at bedtime  losartan 25 milliGRAM(s) Oral daily  pantoprazole    Tablet 40 milliGRAM(s) Oral before breakfast  senna 2 Tablet(s) Oral at bedtime PRN    IVF:  dextrose 5%. 1000 milliLiter(s) IV Continuous <Continuous>  lactated ringers. 1000 milliLiter(s) IV Continuous <Continuous>    CULTURES:    RADIOLOGY & ADDITIONAL TESTS:      ASSESSMENT:  49y Male s/p T11-12 laminectomy POD#4.    LEG WEAKNESS BILATERAL  No pertinent family history in first degree relatives  Handoff  MEWS Score  HTN (hypertension)  Leg weakness, bilateral  Elevated hemoglobin A1c  [place] (acute kidney injury)  Other elevated white blood cell (WBC) count  Postoperative state  Preop examination  Essential hypertension  Leg weakness, bilateral  No significant past surgical history  WEAKNESS      PLAN:  NEURO:  - neuro/vitals checks  - pain control  - HMV d/c 1/29    CARDIOVASCULAR:  - normotensive SBP goal  - cont losartan, norvasc    PULMONARY:  - IS, satting well RA    RENAL:  - daily labs, repletions prn    GI:  - diabetic diet  - bowel regimen    HEME:  - H/H stable  - SCDs, SQL    ID:  - afebrile, downtrending white count    ENDO:  - ISS  - glucose goal 140-180  - will need to f/u with endocrinologist or PCP outpatient for new onset DM tx    DVT PROPHYLAXIS:  [x] Venodynes                                [x] Heparin/Lovenox    DISPOSITION: pending AR. full code. d/w Dr. Mcgregor    Assessment:  Present when checked    []  GCS  E   V  M     Heart Failure: []Acute, [] acute on chronic , []chronic  Heart Failure:  [] Diastolic (HFpEF), [] Systolic (HFrEF), []Combined (HFpEF and HFrEF), [] RHF, [] Pulm HTN, [] Other    [] [place], [] ATN, [] AIN, [] other  [] CKD1, [] CKD2, [] CKD 3, [] CKD 4, [] CKD 5, []ESRD    Encephalopathy: [] Metabolic, [] Hepatic, [] toxic, [] Neurological, [] Other    Abnormal Nurtitional Status: [] malnurtition (see nutrition note), [ ]underweight: BMI < 19, [] morbid obesity: BMI >40, [] Cachexia    [] Sepsis  [] hypovolemic shock,[] cardiogenic shock, [] hemorrhagic shock, [] neuogenic shock  [] Acute Respiratory Failure  []Cerebral edema, [] Brain compression/ herniation,   [] Functional quadriplegia  [] Acute blood loss anemia

## 2020-01-30 NOTE — PROGRESS NOTE ADULT - PROBLEM SELECTOR PLAN 2
BP is elevated and started the patient on Losartan.  BP is better controlled  but not at target and DC PRN labetolol and continue hydralazine avoid rapid drop in BP.  I increased Losartan

## 2020-01-30 NOTE — PROGRESS NOTE ADULT - SUBJECTIVE AND OBJECTIVE BOX
Interval Events: Reviewed  Patient seen and examined at bedside.    Patient is a 49y old  Male who presents with a chief complaint of Cord Compression (30 Jan 2020 07:15)    he is doing well.  pain is controlled  PAST MEDICAL & SURGICAL HISTORY:  HTN (hypertension)  No significant past surgical history      MEDICATIONS:  Pulmonary:    Antimicrobials:    Anticoagulants:  enoxaparin Injectable 40 milliGRAM(s) SubCutaneous every 24 hours    Cardiac:  amLODIPine   Tablet 10 milliGRAM(s) Oral daily  hydrALAZINE Injectable 20 milliGRAM(s) IV Push every 2 hours PRN  losartan 50 milliGRAM(s) Oral daily      Allergies    No Known Allergies    Intolerances        Vital Signs Last 24 Hrs  T(C): 37.2 (30 Jan 2020 14:01), Max: 37.2 (30 Jan 2020 14:01)  T(F): 98.9 (30 Jan 2020 14:01), Max: 98.9 (30 Jan 2020 14:01)  HR: 84 (30 Jan 2020 14:01) (77 - 89)  BP: 166/96 (30 Jan 2020 14:01) (151/70 - 177/89)  BP(mean): --  RR: 18 (30 Jan 2020 14:01) (18 - 20)  SpO2: 97% (30 Jan 2020 14:01) (95% - 98%)    01-29 @ 07:01 - 01-30 @ 07:00  --------------------------------------------------------  IN: 0 mL / OUT: 1500 mL / NET: -1500 mL    01-30 @ 07:01 - 01-30 @ 14:40  --------------------------------------------------------  IN: 0 mL / OUT: 1000 mL / NET: -1000 mL          Review of Systems:   •	General: negative  •	Skin/Breast: negative  •	Ophthalmologic: negative  •	ENMT: negative  •	Respiratory and Thorax: negative  •	Cardiovascular: negative  •	Gastrointestinal: negative  •	Genitourinary: negative  •	Musculoskeletal: negative  •	Neurological: negative  •	Psychiatric: negative  •	Hematology/Lymphatics: negative  •	Endocrine: negative  •	Allergic/Immunologic: negative    Physical Exam:   • Constitutional:	Well-developed, well nourished  • Eyes:	EOMI; PERRL; no drainage or redness  • ENMT:	No oral lesions; no gross abnormalities  • Neck	No bruits; no thyromegaly or nodules  • Breasts:	not examined  • Back:	No deformity or limitation of movement  • Respiratory:	Breath Sounds equal & clear to percussion & auscultation, no accessory muscle use  • Cardiovascular:	Regular rate & rhythm, normal S1, S2; no murmurs, gallops or rubs; no S3, S4  • Gastrointestinal:	Soft, non-tender, no hepatosplenomegaly, normal bowel sounds  • Genitourinary:	not examined  • Rectal: not examined  • Extremities:	No cyanosis, clubbing or edema  • Vascular:	Equal and normal pulses (carotid, femoral, dorsalis pedis)  • Neurologica:l	not examined  • Skin:	No lesions; no rash  • Lymph Nodes:	No lymphadedenopathy  • Musculoskeletal:	No joint pain, swelling or deformity; no limitation of movement        LABS:      CBC Full  -  ( 30 Jan 2020 07:01 )  WBC Count : 12.31 K/uL  RBC Count : 4.28 M/uL  Hemoglobin : 11.5 g/dL  Hematocrit : 35.9 %  Platelet Count - Automated : 221 K/uL  Mean Cell Volume : 83.9 fl  Mean Cell Hemoglobin : 26.9 pg  Mean Cell Hemoglobin Concentration : 32.0 gm/dL  Auto Neutrophil # : x  Auto Lymphocyte # : x  Auto Monocyte # : x  Auto Eosinophil # : x  Auto Basophil # : x  Auto Neutrophil % : x  Auto Lymphocyte % : x  Auto Monocyte % : x  Auto Eosinophil % : x  Auto Basophil % : x    01-30    138  |  102  |  29<H>  ----------------------------<  170<H>  3.9   |  24  |  1.11    Ca    8.6      30 Jan 2020 07:01                          RADIOLOGY & ADDITIONAL STUDIES (The following images were personally reviewed):  Andrade:                                     No  Urine output:                       adequate  DVT prophylaxis:                 Yes  Flattus:                                  Yes  Bowel movement:             yes

## 2020-01-31 LAB
ANION GAP SERPL CALC-SCNC: 9 MMOL/L — SIGNIFICANT CHANGE UP (ref 5–17)
BUN SERPL-MCNC: 31 MG/DL — HIGH (ref 7–23)
CALCIUM SERPL-MCNC: 8.6 MG/DL — SIGNIFICANT CHANGE UP (ref 8.4–10.5)
CHLORIDE SERPL-SCNC: 102 MMOL/L — SIGNIFICANT CHANGE UP (ref 96–108)
CO2 SERPL-SCNC: 24 MMOL/L — SIGNIFICANT CHANGE UP (ref 22–31)
CREAT SERPL-MCNC: 1.15 MG/DL — SIGNIFICANT CHANGE UP (ref 0.5–1.3)
GLUCOSE BLDC GLUCOMTR-MCNC: 136 MG/DL — HIGH (ref 70–99)
GLUCOSE BLDC GLUCOMTR-MCNC: 157 MG/DL — HIGH (ref 70–99)
GLUCOSE BLDC GLUCOMTR-MCNC: 166 MG/DL — HIGH (ref 70–99)
GLUCOSE BLDC GLUCOMTR-MCNC: 265 MG/DL — HIGH (ref 70–99)
GLUCOSE SERPL-MCNC: 148 MG/DL — HIGH (ref 70–99)
HCT VFR BLD CALC: 36.2 % — LOW (ref 39–50)
HGB BLD-MCNC: 11.1 G/DL — LOW (ref 13–17)
MCHC RBC-ENTMCNC: 26.3 PG — LOW (ref 27–34)
MCHC RBC-ENTMCNC: 30.7 GM/DL — LOW (ref 32–36)
MCV RBC AUTO: 85.8 FL — SIGNIFICANT CHANGE UP (ref 80–100)
NRBC # BLD: 0 /100 WBCS — SIGNIFICANT CHANGE UP (ref 0–0)
PLATELET # BLD AUTO: 212 K/UL — SIGNIFICANT CHANGE UP (ref 150–400)
POTASSIUM SERPL-MCNC: 3.9 MMOL/L — SIGNIFICANT CHANGE UP (ref 3.5–5.3)
POTASSIUM SERPL-SCNC: 3.9 MMOL/L — SIGNIFICANT CHANGE UP (ref 3.5–5.3)
RBC # BLD: 4.22 M/UL — SIGNIFICANT CHANGE UP (ref 4.2–5.8)
RBC # FLD: 14 % — SIGNIFICANT CHANGE UP (ref 10.3–14.5)
SODIUM SERPL-SCNC: 135 MMOL/L — SIGNIFICANT CHANGE UP (ref 135–145)
WBC # BLD: 12.28 K/UL — HIGH (ref 3.8–10.5)
WBC # FLD AUTO: 12.28 K/UL — HIGH (ref 3.8–10.5)

## 2020-01-31 PROCEDURE — 99232 SBSQ HOSP IP/OBS MODERATE 35: CPT

## 2020-01-31 PROCEDURE — 99232 SBSQ HOSP IP/OBS MODERATE 35: CPT | Mod: GC

## 2020-01-31 RX ADMIN — OXYCODONE HYDROCHLORIDE 5 MILLIGRAM(S): 5 TABLET ORAL at 15:30

## 2020-01-31 RX ADMIN — PANTOPRAZOLE SODIUM 40 MILLIGRAM(S): 20 TABLET, DELAYED RELEASE ORAL at 06:44

## 2020-01-31 RX ADMIN — LOSARTAN POTASSIUM 50 MILLIGRAM(S): 100 TABLET, FILM COATED ORAL at 05:45

## 2020-01-31 RX ADMIN — Medication 650 MILLIGRAM(S): at 05:44

## 2020-01-31 RX ADMIN — ENOXAPARIN SODIUM 40 MILLIGRAM(S): 100 INJECTION SUBCUTANEOUS at 21:26

## 2020-01-31 RX ADMIN — OXYCODONE HYDROCHLORIDE 5 MILLIGRAM(S): 5 TABLET ORAL at 15:00

## 2020-01-31 RX ADMIN — Medication 6: at 22:19

## 2020-01-31 RX ADMIN — AMLODIPINE BESYLATE 10 MILLIGRAM(S): 2.5 TABLET ORAL at 05:45

## 2020-01-31 RX ADMIN — Medication 2: at 17:56

## 2020-01-31 RX ADMIN — Medication 650 MILLIGRAM(S): at 06:45

## 2020-01-31 NOTE — PROGRESS NOTE ADULT - SUBJECTIVE AND OBJECTIVE BOX
HPI:  Pt is a 50 y/o M PMH HTN presents after fall and leg weakness x 1 day. Pt has had intermittent leg weakness for the past few weeks, but yesterday his knees "buckled" causing him to fall. The same thing happened to him again when he tried to stand up. He reports feeling "muscle spasm" in low back and intermittent numbness in b/l LEs. Pt denies fever, HA, vision changes, paresthesias, N/V, urinary/bowel incontinence, saddle anesthesia. Pt takes norvasc for HTN. (24 Jan 2020 19:16)      Hospital Course:  1/26: POD#0 S/P T11-T12 laminectomy  1/27: POD#1 CARLITOS overnight, pain is well controlled, HMV, Trend Cr (1.35), Cont LR @ 80cc/hr  1/28: POD#2 CARLITOS overnight, neuro stable. Pain well controlled. HMV output 40cc overnight/88 past 24 hrs. Pending AR  1/29: POD#3 CARLITOS, losartan added BP control  1/30: POD#4 CARLITOS overnight, neuro stable. pending AR  1/31: POD#5 CARLITOS overnight. Pending AR      Vital Signs Last 24 Hrs  T(C): 36.4 (31 Jan 2020 05:41), Max: 37.2 (30 Jan 2020 14:01)  T(F): 97.6 (31 Jan 2020 05:41), Max: 98.9 (30 Jan 2020 14:01)  HR: 86 (31 Jan 2020 05:41) (80 - 89)  BP: 167/80 (31 Jan 2020 05:41) (151/87 - 169/87)  BP(mean): --  RR: 18 (31 Jan 2020 05:41) (18 - 20)  SpO2: 96% (31 Jan 2020 05:41) (95% - 98%)    I&O's Summary    29 Jan 2020 07:01  -  30 Jan 2020 07:00  --------------------------------------------------------  IN: 0 mL / OUT: 1500 mL / NET: -1500 mL    30 Jan 2020 07:01  -  31 Jan 2020 06:02  --------------------------------------------------------  IN: 0 mL / OUT: 1300 mL / NET: -1300 mL        PHYSICAL EXAM:  GEN: laying in bed, appears well, NAD  NEURO: AOx3. FC, OE spont, speech clear, face symmetric. CNII-XII intact, PERRL, EOMI. No drift. MAEx4. RUE/LUE 5/5 strength. RLE/LLE 3/5 b/l. SILT  CV: RRR +S1/S2  PULM: CTAB  GI: Abd obese, NT/ND  EXT: Ext warm, dry, nontender  WOUND: thoracic incision C/D/I      TUBES/LINES:  [] Andrade  [] A-line  [] Lumbar Drain  [] Wound Drains  [] NGT   [] EVD   [] CVC  [] Other      DIET:  [] NPO  [x] Mechanical  [] Tube feeds    LABS:                        11.5   12.31 )-----------( 221      ( 30 Jan 2020 07:01 )             35.9     01-30    138  |  102  |  29<H>  ----------------------------<  170<H>  3.9   |  24  |  1.11    Ca    8.6      30 Jan 2020 07:01              CAPILLARY BLOOD GLUCOSE      POCT Blood Glucose.: 189 mg/dL (30 Jan 2020 21:45)  POCT Blood Glucose.: 169 mg/dL (30 Jan 2020 17:26)  POCT Blood Glucose.: 159 mg/dL (30 Jan 2020 12:08)  POCT Blood Glucose.: 153 mg/dL (30 Jan 2020 07:11)      Drug Levels: [] N/A    CSF Analysis: [] N/A      Allergies    No Known Allergies    Intolerances        Home Medications:      MEDICATIONS:  MEDICATIONS  (STANDING):  amLODIPine   Tablet 10 milliGRAM(s) Oral daily  dextrose 5%. 1000 milliLiter(s) (50 mL/Hr) IV Continuous <Continuous>  dextrose 50% Injectable 12.5 Gram(s) IV Push once  enoxaparin Injectable 40 milliGRAM(s) SubCutaneous every 24 hours  insulin lispro (HumaLOG) corrective regimen sliding scale   SubCutaneous Before meals and at bedtime  lactated ringers. 1000 milliLiter(s) (80 mL/Hr) IV Continuous <Continuous>  losartan 50 milliGRAM(s) Oral daily  pantoprazole    Tablet 40 milliGRAM(s) Oral before breakfast    MEDICATIONS  (PRN):  acetaminophen   Tablet .. 650 milliGRAM(s) Oral every 6 hours PRN Temp greater or equal to 38C (100.4F), Mild Pain (1 - 3)  dextrose 40% Gel 15 Gram(s) Oral once PRN Blood Glucose LESS THAN 70 milliGRAM(s)/deciliter  diazepam    Tablet 5 milliGRAM(s) Oral every 6 hours PRN back spasm  glucagon  Injectable 1 milliGRAM(s) IntraMuscular once PRN Glucose LESS THAN 70 milligrams/deciliter  hydrALAZINE Injectable 20 milliGRAM(s) IV Push every 2 hours PRN SBP>160, first line  ondansetron Injectable 4 milliGRAM(s) IV Push every 6 hours PRN Nausea and/or Vomiting  oxyCODONE    IR 10 milliGRAM(s) Oral every 6 hours PRN Severe Pain (7 - 10)  oxyCODONE    IR 5 milliGRAM(s) Oral every 4 hours PRN Moderate Pain (4 - 6)  senna 2 Tablet(s) Oral at bedtime PRN Constipation      CULTURES:      RADIOLOGY & ADDITIONAL TESTS:      ASSESSMENT:  49y Male s/p T11-12 laminectomy POD#5      PLAN:  -neuro/vital checks  -pain control prn  -sutures to remain in place for 2 weeks post op   -SBP normotensive  -regular diet, bowel regimen  -continue IVF- trend Cr  -OOB/PT/OT  -dispo- pending AR  -d/w Dr. Mcgregor      Assessment: present when checked     [] GCS   E   V   M     Heart Failure: [] Acute, [] acute on chronic, [] chronic   Heart Failure: [] Diastolic (HFpEF), [] Systolic (HRrEF), [] Combined (HFpEF and HFrEF), [] RHF, [] Pulm HTN, [] Other     [] [place], [] ATN, [] AIN, [] other   [] CKD1, [] CKD2, [] CKD3, [] CKD4, [] CKD5, [] ESRD     Encephalopathy: [] Metabolic, [] Hepatic, [] Toxic, [] Neurological, [] Other     Abnormal Nutritional Status: [] malnutrition (see nutrition note), []underweight: BMI <19, [] morbid obesity: BMI >40, [] Cachexia     [] Sepsis   [] Hypovolemic shock, [] Cardiogenic shock, [] Hemorrhagic shock, [] Neurogenic shock   [] Acute respiratory failure   [] Cerebral edema, [] Brain compression / herniation   [] Functional quadriplegia   [] Acute blood loss anemia

## 2020-01-31 NOTE — PROGRESS NOTE ADULT - PROBLEM SELECTOR PLAN 2
BP is elevated and started the patient on Losartan.  BP is better controlled  but not at target and DC PRN labetolol and continue hydralazine avoid rapid drop in BP.  I increased Losartan and follow on BP over the next 24 hours

## 2020-01-31 NOTE — PROGRESS NOTE ADULT - SUBJECTIVE AND OBJECTIVE BOX
Neurology Follow up note    Name  DRAKE WHITTAKER    HPI:  Pt is a 50 y/o M PMH HTN presents after fall and leg weakness x 1 day. Pt has had intermittent leg weakness for the past few weeks, but yesterday his knees "buckled" causing him to fall. The same thing happened to him again when he tried to stand up. He reports feeling "muscle spasm" in low back and intermittent numbness in b/l LEs. Pt denies fever, HA, vision changes, paresthesias, N/V, urinary/bowel incontinence, saddle anesthesia. Pt takes norvasc for HTN. (24 Jan 2020 19:16)      Interval History - slowly improving strength in the LE- gait unsteady        REVIEW OF SYSTEMS    Vital Signs Last 24 Hrs  T(C): 37.1 (31 Jan 2020 09:11), Max: 37.2 (30 Jan 2020 14:01)  T(F): 98.7 (31 Jan 2020 09:11), Max: 98.9 (30 Jan 2020 14:01)  HR: 82 (31 Jan 2020 09:11) (80 - 88)  BP: 158/84 (31 Jan 2020 09:11) (151/87 - 167/80)  BP(mean): --  RR: 17 (31 Jan 2020 09:11) (17 - 20)  SpO2: 95% (31 Jan 2020 09:11) (95% - 98%)    Physical Exam-     Mental Status- awake and alert    Cranial Nerves- full EOM    Gait and station- n/a    Motor- moves all 4 extremities- weakness LE    Reflexes- decreased    Sensation- no sensory level    Coordination- no tremors    Vascular - no bruits    Medications  acetaminophen   Tablet .. 650 milliGRAM(s) Oral every 6 hours PRN  amLODIPine   Tablet 10 milliGRAM(s) Oral daily  dextrose 40% Gel 15 Gram(s) Oral once PRN  dextrose 5%. 1000 milliLiter(s) IV Continuous <Continuous>  dextrose 50% Injectable 12.5 Gram(s) IV Push once  diazepam    Tablet 5 milliGRAM(s) Oral every 6 hours PRN  enoxaparin Injectable 40 milliGRAM(s) SubCutaneous every 24 hours  glucagon  Injectable 1 milliGRAM(s) IntraMuscular once PRN  hydrALAZINE Injectable 20 milliGRAM(s) IV Push every 2 hours PRN  insulin lispro (HumaLOG) corrective regimen sliding scale   SubCutaneous Before meals and at bedtime  lactated ringers. 1000 milliLiter(s) IV Continuous <Continuous>  losartan 50 milliGRAM(s) Oral daily  ondansetron Injectable 4 milliGRAM(s) IV Push every 6 hours PRN  oxyCODONE    IR 10 milliGRAM(s) Oral every 6 hours PRN  oxyCODONE    IR 5 milliGRAM(s) Oral every 4 hours PRN  pantoprazole    Tablet 40 milliGRAM(s) Oral before breakfast  senna 2 Tablet(s) Oral at bedtime PRN      Lab      Radiology    Assessment- T-L mylopathy    Plan as per NS

## 2020-01-31 NOTE — PROGRESS NOTE ADULT - SUBJECTIVE AND OBJECTIVE BOX
Interval Events: Reviewed  Patient seen and examined at bedside.    Patient is a 49y old  Male who presents with a chief complaint of Cord Compression (31 Jan 2020 10:16)    he is doing better and walking .  pain is controlled  PAST MEDICAL & SURGICAL HISTORY:  HTN (hypertension)  No significant past surgical history      MEDICATIONS:  Pulmonary:    Antimicrobials:    Anticoagulants:  enoxaparin Injectable 40 milliGRAM(s) SubCutaneous every 24 hours    Cardiac:  amLODIPine   Tablet 10 milliGRAM(s) Oral daily  hydrALAZINE Injectable 20 milliGRAM(s) IV Push every 2 hours PRN  losartan 50 milliGRAM(s) Oral daily      Allergies    No Known Allergies    Intolerances        Vital Signs Last 24 Hrs  T(C): 36.7 (31 Jan 2020 14:11), Max: 37.1 (31 Jan 2020 00:40)  T(F): 98 (31 Jan 2020 14:11), Max: 98.7 (31 Jan 2020 00:40)  HR: 79 (31 Jan 2020 14:11) (79 - 88)  BP: 148/76 (31 Jan 2020 14:11) (148/76 - 167/80)  BP(mean): --  RR: 18 (31 Jan 2020 14:11) (17 - 20)  SpO2: 98% (31 Jan 2020 14:11) (95% - 98%)    01-30 @ 07:01 - 01-31 @ 07:00  --------------------------------------------------------  IN: 0 mL / OUT: 1300 mL / NET: -1300 mL    01-31 @ 07:01 - 01-31 @ 14:19  --------------------------------------------------------  IN: 0 mL / OUT: 300 mL / NET: -300 mL          Review of Systems:   •	General: negative  •	Skin/Breast: negative  •	Ophthalmologic: negative  •	ENMT: negative  •	Respiratory and Thorax: negative  •	Cardiovascular: negative  •	Gastrointestinal: negative  •	Genitourinary: negative  •	Musculoskeletal: negative  •	Neurological: negative  •	Psychiatric: negative  •	Hematology/Lymphatics: negative  •	Endocrine: negative  •	Allergic/Immunologic: negative    Physical Exam:   • Constitutional:	Well-developed, well nourished  • Eyes:	EOMI; PERRL; no drainage or redness  • ENMT:	No oral lesions; no gross abnormalities  • Neck	No bruits; no thyromegaly or nodules  • Breasts:	not examined  • Back:	No deformity or limitation of movement  • Respiratory:	Breath Sounds equal & clear to percussion & auscultation, no accessory muscle use  • Cardiovascular:	Regular rate & rhythm, normal S1, S2; no murmurs, gallops or rubs; no S3, S4  • Gastrointestinal:	Soft, non-tender, no hepatosplenomegaly, normal bowel sounds  • Genitourinary:	not examined  • Rectal: not examined  • Extremities:	No cyanosis, clubbing or edema  • Vascular:	Equal and normal pulses (carotid, femoral, dorsalis pedis)  • Neurologica:l	not examined  • Skin:	No lesions; no rash  • Lymph Nodes:	No lymphadedenopathy  • Musculoskeletal:	No joint pain, swelling or deformity; no limitation of movement        LABS:      CBC Full  -  ( 31 Jan 2020 07:59 )  WBC Count : 12.28 K/uL  RBC Count : 4.22 M/uL  Hemoglobin : 11.1 g/dL  Hematocrit : 36.2 %  Platelet Count - Automated : 212 K/uL  Mean Cell Volume : 85.8 fl  Mean Cell Hemoglobin : 26.3 pg  Mean Cell Hemoglobin Concentration : 30.7 gm/dL  Auto Neutrophil # : x  Auto Lymphocyte # : x  Auto Monocyte # : x  Auto Eosinophil # : x  Auto Basophil # : x  Auto Neutrophil % : x  Auto Lymphocyte % : x  Auto Monocyte % : x  Auto Eosinophil % : x  Auto Basophil % : x    01-31    135  |  102  |  31<H>  ----------------------------<  148<H>  3.9   |  24  |  1.15    Ca    8.6      31 Jan 2020 07:59                          RADIOLOGY & ADDITIONAL STUDIES (The following images were personally reviewed):  Andrade:                                     No  Urine output:                       adequate  DVT prophylaxis:                 Yes  Flattus:                                  Yes  Bowel movement:              No

## 2020-01-31 NOTE — PROGRESS NOTE ADULT - ATTENDING COMMENTS
Patient seen and examined with house-staff during bedside rounds.  Resident note read, including vitals, physical findings, laboratory data, and radiological reports.   Revisions included below.  Direct personal management at bed side and extensive interpretation of the data.  Plan was outlined and discussed in details with the housestaff.  Decision making of high complexity  Action taken for acute disease activity to reflect the level of care provided:  - medication reconciliation  - review laboratory data
Patient seen and examined with house-staff during bedside rounds.  Resident note read, including vitals, physical findings, laboratory data, and radiological reports.   Revisions included below.  Direct personal management at bed side and extensive interpretation of the data.  Plan was outlined and discussed in details with the housestaff.  Decision making of high complexity  Action taken for acute disease activity to reflect the level of care provided:  - medication reconciliation  - review laboratory data  BS is at target between 140-180 in the hospital and follow as outpatient regarding elevated HbA1C
Patient seen and examined with house-staff during bedside rounds.  Resident note read, including vitals, physical findings, laboratory data, and radiological reports.   Revisions included below.  Direct personal management at bed side and extensive interpretation of the data.  Plan was outlined and discussed in details with the housestaff.  Decision making of high complexity  Action taken for acute disease activity to reflect the level of care provided:  - medication reconciliation  - review laboratory data
Patient seen and examined with house-staff during bedside rounds.  Resident note read, including vitals, physical findings, laboratory data, and radiological reports.   Revisions included below.  Direct personal management at bed side and extensive interpretation of the data.  Plan was outlined and discussed in details with the housestaff.  Decision making of high complexity  Action taken for acute disease activity to reflect the level of care provided:  - medication reconciliation  - review laboratory data  BS is at target between 140-180 in the hospital and follow as outpatient regarding elevated HbA1C
Patient seen and examined with house-staff during bedside rounds.  Resident note read, including vitals, physical findings, laboratory data, and radiological reports.   Revisions included below.  Direct personal management at bed side and extensive interpretation of the data.  Plan was outlined and discussed in details with the housestaff.  Decision making of high complexity  Action taken for acute disease activity to reflect the level of care provided:  - medication reconciliation  - review laboratory data  venous doppler negative

## 2020-02-01 ENCOUNTER — TRANSCRIPTION ENCOUNTER (OUTPATIENT)
Age: 50
End: 2020-02-01

## 2020-02-01 VITALS
OXYGEN SATURATION: 96 % | HEART RATE: 81 BPM | DIASTOLIC BLOOD PRESSURE: 86 MMHG | SYSTOLIC BLOOD PRESSURE: 168 MMHG | TEMPERATURE: 98 F | RESPIRATION RATE: 17 BRPM

## 2020-02-01 LAB
ANION GAP SERPL CALC-SCNC: 12 MMOL/L — SIGNIFICANT CHANGE UP (ref 5–17)
BUN SERPL-MCNC: 33 MG/DL — HIGH (ref 7–23)
CALCIUM SERPL-MCNC: 8.9 MG/DL — SIGNIFICANT CHANGE UP (ref 8.4–10.5)
CHLORIDE SERPL-SCNC: 100 MMOL/L — SIGNIFICANT CHANGE UP (ref 96–108)
CO2 SERPL-SCNC: 22 MMOL/L — SIGNIFICANT CHANGE UP (ref 22–31)
CREAT SERPL-MCNC: 1.28 MG/DL — SIGNIFICANT CHANGE UP (ref 0.5–1.3)
GLUCOSE BLDC GLUCOMTR-MCNC: 127 MG/DL — HIGH (ref 70–99)
GLUCOSE BLDC GLUCOMTR-MCNC: 159 MG/DL — HIGH (ref 70–99)
GLUCOSE BLDC GLUCOMTR-MCNC: 177 MG/DL — HIGH (ref 70–99)
GLUCOSE SERPL-MCNC: 140 MG/DL — HIGH (ref 70–99)
HCT VFR BLD CALC: 33.9 % — LOW (ref 39–50)
HGB BLD-MCNC: 10.9 G/DL — LOW (ref 13–17)
MCHC RBC-ENTMCNC: 27 PG — SIGNIFICANT CHANGE UP (ref 27–34)
MCHC RBC-ENTMCNC: 32.2 GM/DL — SIGNIFICANT CHANGE UP (ref 32–36)
MCV RBC AUTO: 83.9 FL — SIGNIFICANT CHANGE UP (ref 80–100)
NRBC # BLD: 0 /100 WBCS — SIGNIFICANT CHANGE UP (ref 0–0)
PLATELET # BLD AUTO: 214 K/UL — SIGNIFICANT CHANGE UP (ref 150–400)
POTASSIUM SERPL-MCNC: 3.9 MMOL/L — SIGNIFICANT CHANGE UP (ref 3.5–5.3)
POTASSIUM SERPL-SCNC: 3.9 MMOL/L — SIGNIFICANT CHANGE UP (ref 3.5–5.3)
RBC # BLD: 4.04 M/UL — LOW (ref 4.2–5.8)
RBC # FLD: 14 % — SIGNIFICANT CHANGE UP (ref 10.3–14.5)
SODIUM SERPL-SCNC: 134 MMOL/L — LOW (ref 135–145)
WBC # BLD: 12.57 K/UL — HIGH (ref 3.8–10.5)
WBC # FLD AUTO: 12.57 K/UL — HIGH (ref 3.8–10.5)

## 2020-02-01 RX ORDER — SENNA PLUS 8.6 MG/1
2 TABLET ORAL
Qty: 0 | Refills: 0 | DISCHARGE
Start: 2020-02-01

## 2020-02-01 RX ORDER — OXYCODONE HYDROCHLORIDE 5 MG/1
1 TABLET ORAL
Qty: 16 | Refills: 0
Start: 2020-02-01 | End: 2020-02-04

## 2020-02-01 RX ORDER — SODIUM CHLORIDE 9 MG/ML
500 INJECTION INTRAMUSCULAR; INTRAVENOUS; SUBCUTANEOUS ONCE
Refills: 0 | Status: COMPLETED | OUTPATIENT
Start: 2020-02-01 | End: 2020-02-01

## 2020-02-01 RX ORDER — LOSARTAN POTASSIUM 100 MG/1
1 TABLET, FILM COATED ORAL
Qty: 30 | Refills: 0
Start: 2020-02-01 | End: 2020-03-01

## 2020-02-01 RX ORDER — ACETAMINOPHEN 500 MG
2 TABLET ORAL
Qty: 0 | Refills: 0 | DISCHARGE
Start: 2020-02-01

## 2020-02-01 RX ORDER — AMLODIPINE BESYLATE 2.5 MG/1
1 TABLET ORAL
Qty: 30 | Refills: 0
Start: 2020-02-01 | End: 2020-03-01

## 2020-02-01 RX ADMIN — LOSARTAN POTASSIUM 50 MILLIGRAM(S): 100 TABLET, FILM COATED ORAL at 06:20

## 2020-02-01 RX ADMIN — OXYCODONE HYDROCHLORIDE 10 MILLIGRAM(S): 5 TABLET ORAL at 19:16

## 2020-02-01 RX ADMIN — Medication 2: at 17:47

## 2020-02-01 RX ADMIN — SODIUM CHLORIDE 1000 MILLILITER(S): 9 INJECTION INTRAMUSCULAR; INTRAVENOUS; SUBCUTANEOUS at 10:55

## 2020-02-01 RX ADMIN — PANTOPRAZOLE SODIUM 40 MILLIGRAM(S): 20 TABLET, DELAYED RELEASE ORAL at 06:19

## 2020-02-01 RX ADMIN — Medication 650 MILLIGRAM(S): at 06:19

## 2020-02-01 RX ADMIN — Medication 650 MILLIGRAM(S): at 07:15

## 2020-02-01 RX ADMIN — Medication 2: at 13:05

## 2020-02-01 RX ADMIN — OXYCODONE HYDROCHLORIDE 5 MILLIGRAM(S): 5 TABLET ORAL at 02:30

## 2020-02-01 RX ADMIN — AMLODIPINE BESYLATE 10 MILLIGRAM(S): 2.5 TABLET ORAL at 06:20

## 2020-02-01 RX ADMIN — Medication 20 MILLIGRAM(S): at 01:30

## 2020-02-01 RX ADMIN — OXYCODONE HYDROCHLORIDE 5 MILLIGRAM(S): 5 TABLET ORAL at 01:23

## 2020-02-01 NOTE — DISCHARGE NOTE PROVIDER - NSDCMRMEDTOKEN_GEN_ALL_CORE_FT
acetaminophen 325 mg oral tablet: 2 tab(s) orally every 6 hours, As needed, Temp greater or equal to 38C (100.4F), Mild Pain (1 - 3)  Cozaar 50 mg oral tablet: 1 tab(s) orally once a day MDD:1  Norvasc 10 mg oral tablet: 1 tab(s) orally once a day MDD:1  oxyCODONE 10 mg oral tablet: 1 tab(s) orally every 6 hours, As Needed -Severe Pain (7 - 10) - for severe pain MDD:4   senna oral tablet: 2 tab(s) orally once a day (at bedtime), As needed, Constipation

## 2020-02-01 NOTE — DISCHARGE NOTE PROVIDER - NSDCCPCAREPLAN_GEN_ALL_CORE_FT
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 Please, continue a diabetic diet and consult with your primary care doctor ASAP for it. We added a new hypertension medication. Limitted physical activity. Do not lift weight, no bending or twisting of spine. Call office for any question or problem, call for fever greater than 101F, for any fluid discharge from wound.  Make appointment to see Dr. Mcgregor in 2 weeks .

## 2020-02-01 NOTE — PROGRESS NOTE ADULT - PROVIDER SPECIALTY LIST ADULT
Internal Medicine
Neurology
Neurosurgery
Neurology

## 2020-02-01 NOTE — DISCHARGE NOTE NURSING/CASE MANAGEMENT/SOCIAL WORK - NSDCPEWEB_GEN_ALL_CORE
[facility] for Tobacco Control website --- [email or web address] website --- [email or web address]

## 2020-02-01 NOTE — DISCHARGE NOTE PROVIDER - HOSPITAL COURSE
1/26: POD#0 S/P T11-T12 laminectomy    1/27: POD#1 CARLITOS overnight, pain is well controlled, HMV, Trend Cr (1.35), Cont LR @ 80cc/hr    1/28: POD#2 CARLITOS overnight, neuro stable. Pain well controlled. HMV output 40cc overnight/88 past 24 hrs. Pending AR    1/29: POD#3 CARLITOS, losartan added BP control    1/30: POD#4 CARLITOS overnight, neuro stable. pending AR    1/31: POD#5 CARLITOS overnight. Pending AR    2/1: POD #6: CARLITOS overnight. Patient was reevaluated by PT and cleared for discharge home with home PT. Patient is agreeable with plan. Patient tolerates regular - diabetic diet.

## 2020-02-01 NOTE — DISCHARGE NOTE NURSING/CASE MANAGEMENT/SOCIAL WORK - PATIENT PORTAL LINK FT
You can access the FollowMyHealth Patient Portal offered by [facility] by registering at the following website: [email or web address]. By joining [employer]’s FollowMyHealth portal, you will also be able to view your health information using other applications (apps) compatible with our system.

## 2020-02-01 NOTE — DISCHARGE NOTE PROVIDER - CARE PROVIDER_API CALL
Rodrick Mcgregor)  Neurosurgery  [address], [place] [facility]  Phone: [phone]  Fax: [phone]  Follow Up Time:

## 2020-02-01 NOTE — PROGRESS NOTE ADULT - REASON FOR ADMISSION
Cord Compression

## 2020-02-01 NOTE — DISCHARGE NOTE NURSING/CASE MANAGEMENT/SOCIAL WORK - NSDCPEEMAIL_GEN_ALL_CORE
[facility] for Tobacco Control email [email or web address]

## 2020-02-01 NOTE — PROGRESS NOTE ADULT - SUBJECTIVE AND OBJECTIVE BOX
HPI:  Pt is a 48 y/o M PMH HTN presents after fall and leg weakness x 1 day. Pt has had intermittent leg weakness for the past few weeks, but yesterday his knees "buckled" causing him to fall. The same thing happened to him again when he tried to stand up. He reports feeling "muscle spasm" in low back and intermittent numbness in b/l LEs. Pt denies fever, HA, vision changes, paresthesias, N/V, urinary/bowel incontinence, saddle anesthesia. Pt takes norvasc for HTN. (24 Jan 2020 19:16)    Hospital Course:  1/26: POD#0 S/P T11-T12 laminectomy  1/27: POD#1 CARLITOS overnight, pain is well controlled, HMV, Trend Cr (1.35), Cont LR @ 80cc/hr  1/28: POD#2 CARLITOS overnight, neuro stable. Pain well controlled. HMV output 40cc overnight/88 past 24 hrs. Pending AR  1/29: POD#3 CARLITOS, losartan added BP control  1/30: POD#4 CARLITOS overnight, neuro stable. pending AR  1/31: POD#5 CARLITOS overnight. Pending AR  2/1: POD #6: CARLITOS overnight pending dc home    OVERNIGHT EVENTS:  Vital Signs Last 24 Hrs  T(C): 37.4 (31 Jan 2020 21:24), Max: 37.4 (31 Jan 2020 21:24)  T(F): 99.4 (31 Jan 2020 21:24), Max: 99.4 (31 Jan 2020 21:24)  HR: 62 (31 Jan 2020 21:24) (62 - 88)  BP: 138/84 (31 Jan 2020 21:24) (138/84 - 178/95)  BP(mean): --  RR: 18 (31 Jan 2020 21:24) (17 - 19)  SpO2: 97% (31 Jan 2020 21:24) (95% - 98%)    I&O's Summary    30 Jan 2020 07:01  -  31 Jan 2020 07:00  --------------------------------------------------------  IN: 0 mL / OUT: 1300 mL / NET: -1300 mL    31 Jan 2020 07:01  -  01 Feb 2020 00:26  --------------------------------------------------------  IN: 0 mL / OUT: 500 mL / NET: -500 mL        PHYSICAL EXAM:  GEN: laying in bed, appears well, NAD  NEURO: AOx3. FC, OE spont, speech clear, face symmetric. CNII-XII intact, PERRL, EOMI. No drift. MAEx4. RUE/LUE 5/5 strength. RLE/LLE 3/5 b/l. SILT  CV: RRR +S1/S2  PULM: CTAB  GI: Abd obese, NT/ND  EXT: Ext warm, dry, nontender  WOUND: thoracic incision C/D/I    TUBES/LINES:  pivs    DIET:  [] NPO  [x] Mechanical  [] Tube feeds    LABS:                        11.1   12.28 )-----------( 212      ( 31 Jan 2020 07:59 )             36.2     01-31    135  |  102  |  31<H>  ----------------------------<  148<H>  3.9   |  24  |  1.15    Ca    8.6      31 Jan 2020 07:59              CAPILLARY BLOOD GLUCOSE      POCT Blood Glucose.: 265 mg/dL (31 Jan 2020 22:12)  POCT Blood Glucose.: 157 mg/dL (31 Jan 2020 17:35)  POCT Blood Glucose.: 166 mg/dL (31 Jan 2020 11:46)  POCT Blood Glucose.: 136 mg/dL (31 Jan 2020 07:10)      Drug Levels: [] N/A    CSF Analysis: [] N/A      Allergies    No Known Allergies    Intolerances      MEDICATIONS:  Antibiotics:    Neuro:  acetaminophen   Tablet .. 650 milliGRAM(s) Oral every 6 hours PRN  diazepam    Tablet 5 milliGRAM(s) Oral every 6 hours PRN  ondansetron Injectable 4 milliGRAM(s) IV Push every 6 hours PRN  oxyCODONE    IR 10 milliGRAM(s) Oral every 6 hours PRN  oxyCODONE    IR 5 milliGRAM(s) Oral every 4 hours PRN    Anticoagulation:  enoxaparin Injectable 40 milliGRAM(s) SubCutaneous every 24 hours    OTHER:  amLODIPine   Tablet 10 milliGRAM(s) Oral daily  dextrose 40% Gel 15 Gram(s) Oral once PRN  dextrose 50% Injectable 12.5 Gram(s) IV Push once  glucagon  Injectable 1 milliGRAM(s) IntraMuscular once PRN  hydrALAZINE Injectable 20 milliGRAM(s) IV Push every 2 hours PRN  insulin lispro (HumaLOG) corrective regimen sliding scale   SubCutaneous Before meals and at bedtime  losartan 50 milliGRAM(s) Oral daily  pantoprazole    Tablet 40 milliGRAM(s) Oral before breakfast  senna 2 Tablet(s) Oral at bedtime PRN    IVF:  dextrose 5%. 1000 milliLiter(s) IV Continuous <Continuous>  lactated ringers. 1000 milliLiter(s) IV Continuous <Continuous>    CULTURES:    RADIOLOGY & ADDITIONAL TESTS:      ASSESSMENT:  49y Male s/p T11-12 laminectomy POD#6      PLAN:  -neuro/vital checks  -pain control prn  -sutures to remain in place for 2 weeks post op   -SBP normotensive  -regular diet, bowel regimen  -continue IVF- trend Cr  -OOB/PT/OT  -dispo- pending home dc   -d/w Dr. Mcgregor

## 2020-02-03 PROCEDURE — 99285 EMERGENCY DEPT VISIT HI MDM: CPT | Mod: 25

## 2020-02-03 PROCEDURE — 85730 THROMBOPLASTIN TIME PARTIAL: CPT

## 2020-02-03 PROCEDURE — 72148 MRI LUMBAR SPINE W/O DYE: CPT

## 2020-02-03 PROCEDURE — 85027 COMPLETE CBC AUTOMATED: CPT

## 2020-02-03 PROCEDURE — 82962 GLUCOSE BLOOD TEST: CPT

## 2020-02-03 PROCEDURE — 36415 COLL VENOUS BLD VENIPUNCTURE: CPT

## 2020-02-03 PROCEDURE — 82330 ASSAY OF CALCIUM: CPT

## 2020-02-03 PROCEDURE — 84132 ASSAY OF SERUM POTASSIUM: CPT

## 2020-02-03 PROCEDURE — 86850 RBC ANTIBODY SCREEN: CPT

## 2020-02-03 PROCEDURE — 97530 THERAPEUTIC ACTIVITIES: CPT

## 2020-02-03 PROCEDURE — 71045 X-RAY EXAM CHEST 1 VIEW: CPT

## 2020-02-03 PROCEDURE — 85018 HEMOGLOBIN: CPT

## 2020-02-03 PROCEDURE — 83036 HEMOGLOBIN GLYCOSYLATED A1C: CPT

## 2020-02-03 PROCEDURE — C1889: CPT

## 2020-02-03 PROCEDURE — 97535 SELF CARE MNGMENT TRAINING: CPT

## 2020-02-03 PROCEDURE — 80053 COMPREHEN METABOLIC PANEL: CPT

## 2020-02-03 PROCEDURE — 86901 BLOOD TYPING SEROLOGIC RH(D): CPT

## 2020-02-03 PROCEDURE — 80048 BASIC METABOLIC PNL TOTAL CA: CPT

## 2020-02-03 PROCEDURE — 85610 PROTHROMBIN TIME: CPT

## 2020-02-03 PROCEDURE — 80051 ELECTROLYTE PANEL: CPT

## 2020-02-03 PROCEDURE — 97116 GAIT TRAINING THERAPY: CPT

## 2020-02-03 PROCEDURE — 84295 ASSAY OF SERUM SODIUM: CPT

## 2020-02-03 PROCEDURE — 97161 PT EVAL LOW COMPLEX 20 MIN: CPT

## 2020-02-03 PROCEDURE — 96374 THER/PROPH/DIAG INJ IV PUSH: CPT

## 2020-02-03 PROCEDURE — 76000 FLUOROSCOPY <1 HR PHYS/QHP: CPT

## 2020-02-03 PROCEDURE — 72146 MRI CHEST SPINE W/O DYE: CPT

## 2020-02-03 PROCEDURE — 72141 MRI NECK SPINE W/O DYE: CPT

## 2020-02-03 PROCEDURE — C8929: CPT

## 2020-02-03 PROCEDURE — 85025 COMPLETE CBC W/AUTO DIFF WBC: CPT

## 2020-02-03 PROCEDURE — 72128 CT CHEST SPINE W/O DYE: CPT

## 2020-02-03 PROCEDURE — 84100 ASSAY OF PHOSPHORUS: CPT

## 2020-02-03 PROCEDURE — 72131 CT LUMBAR SPINE W/O DYE: CPT

## 2020-02-03 PROCEDURE — 83735 ASSAY OF MAGNESIUM: CPT

## 2020-02-03 PROCEDURE — 93970 EXTREMITY STUDY: CPT

## 2020-02-06 DIAGNOSIS — I10 ESSENTIAL (PRIMARY) HYPERTENSION: ICD-10-CM

## 2020-02-06 DIAGNOSIS — M51.04 INTERVERTEBRAL DISC DISORDERS WITH MYELOPATHY, THORACIC REGION: ICD-10-CM

## 2020-02-06 DIAGNOSIS — R73.03 PREDIABETES: ICD-10-CM

## 2020-02-06 DIAGNOSIS — N17.9 ACUTE KIDNEY FAILURE, UNSPECIFIED: ICD-10-CM

## 2020-02-06 DIAGNOSIS — M48.04 SPINAL STENOSIS, THORACIC REGION: ICD-10-CM

## 2020-02-06 DIAGNOSIS — M47.14 OTHER SPONDYLOSIS WITH MYELOPATHY, THORACIC REGION: ICD-10-CM

## 2020-02-06 DIAGNOSIS — G95.20 UNSPECIFIED CORD COMPRESSION: ICD-10-CM

## 2020-02-06 DIAGNOSIS — E66.01 MORBID (SEVERE) OBESITY DUE TO EXCESS CALORIES: ICD-10-CM

## 2020-02-18 PROBLEM — Z00.00 ENCOUNTER FOR PREVENTIVE HEALTH EXAMINATION: Status: ACTIVE | Noted: 2020-02-18

## 2020-02-20 ENCOUNTER — APPOINTMENT (OUTPATIENT)
Dept: NEUROSURGERY | Facility: CLINIC | Age: 50
End: 2020-02-20
Payer: COMMERCIAL

## 2020-02-20 VITALS
RESPIRATION RATE: 18 BRPM | OXYGEN SATURATION: 98 % | TEMPERATURE: 98.3 F | HEIGHT: 74 IN | BODY MASS INDEX: 40.43 KG/M2 | HEART RATE: 84 BPM | WEIGHT: 315 LBS | SYSTOLIC BLOOD PRESSURE: 207 MMHG | DIASTOLIC BLOOD PRESSURE: 95 MMHG

## 2020-02-20 DIAGNOSIS — Z82.49 FAMILY HISTORY OF ISCHEMIC HEART DISEASE AND OTHER DISEASES OF THE CIRCULATORY SYSTEM: ICD-10-CM

## 2020-02-20 DIAGNOSIS — Z87.891 PERSONAL HISTORY OF NICOTINE DEPENDENCE: ICD-10-CM

## 2020-02-20 DIAGNOSIS — Z83.3 FAMILY HISTORY OF DIABETES MELLITUS: ICD-10-CM

## 2020-02-20 DIAGNOSIS — I10 ESSENTIAL (PRIMARY) HYPERTENSION: ICD-10-CM

## 2020-02-20 DIAGNOSIS — Z78.9 OTHER SPECIFIED HEALTH STATUS: ICD-10-CM

## 2020-02-20 DIAGNOSIS — Z48.02 ENCOUNTER FOR REMOVAL OF SUTURES: ICD-10-CM

## 2020-02-20 PROCEDURE — 99024 POSTOP FOLLOW-UP VISIT: CPT

## 2020-02-20 NOTE — ASSESSMENT
[FreeTextEntry1] : \par No Bend/Lift/Twist\par Do not lift anything more than 5 pounds for 2 weeks after surgery\par Do NOT smoke or use nicotine products as it can prevent bony fusion and delay healing\par Do not apply lotions or ointments over incision\par No tubs, pools for 6-8 weeks\par Report all s/s infection including drainage, redness, warmth, fever, weakness, chills.\par Avoid direct sun exposure to incision site for 3-6 months\par Gradually increase activities as tolerated\par \par Report all s/s infection including drainage, odor, redness, fever greater than 101,\par \par \par -BP elevated at today's visit -denies s/s stroke. Originally was 206/88 - patient states he was anxious for suture removal. After suture removal, BP rechecked and was 174/85 - patient remains asymptomatic.\par Patient has been taking amlodipine 10 mg as prescribed but states he was not given Cozaar as prescribed on discharge medications. Will order Cozaar today and schedule patient to establish care with PCP for management of HTN.\par Recommend logging BP daily and bring to PCP.\par Educated regarding s/s stroke, report to ED. \par Patient scheduled to see Dr. Lindsey next Thursday.\par \par Dr. Mcgregor present for visit to evaluate patient progress. He recommends PT and follow up to see him back in one month.\par PT Rx given to patient.\par \par Plan:\par \par 1. Consult with PCP for management of hypertension - appointment scheduled with Dr. Lindsey\par 2. Physical therapy\par 3. RTO to see Dr. Mcgregor in one month\par \par Patient and patient's wife verbalize agreement and understanding with plan.\par

## 2020-02-20 NOTE — HISTORY OF PRESENT ILLNESS
[FreeTextEntry1] : 49 year old man with past medical history who presented to [facility] with progressively worsening bilateral leg weakness, numbness and paresthesias after a recent fall.\par \par Spinal imaging demonstrated severe thoracic spinal stenosis at T11 and T12 resulting in spinal cord compression att hose levels. 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

## 2020-02-20 NOTE — REVIEW OF SYSTEMS
[As Noted in HPI] : as noted in HPI [Leg Weakness] : leg weakness [Difficulty Walking] : difficulty walking [Negative] : Heme/Lymph [Arm Weakness] : no arm weakness [Hand Weakness] : no hand weakness

## 2020-02-20 NOTE — REASON FOR VISIT
[Spouse] : spouse [de-identified] : 1/26/2020 [de-identified] : Bilateral thoracic laminectomy, medical facetectomy and foraminotomy T11 and T12 [de-identified] : \par Reports he is doing well.\par Denies any signs of postop wound infection which could include but is not limited to redness/swelling/purulent drainage\par Denies CP/SOB/unilateral leg edema. Denies headaches, nausea, vomiting, dizziness, weakness. \par BP elevated at today's visit. Denies s/s stroke. He is asymptomatic.\par He is slowly introducing preop activities\par He uses a walker for assistance with ambulation as he continues to have bilateral lower extremity weakness and imbalance. Denies falls. Denies urinary incontinence. Denies numbness/tingling of lower extremities.\par

## 2020-02-20 NOTE — PHYSICAL EXAM
[General Appearance - Alert] : alert [General Appearance - In No Acute Distress] : in no acute distress [Clean] : clean [Intact] : intact [Dry] : dry [No Drainage] : without drainage [Normal Skin] : normal [Oriented To Time, Place, And Person] : oriented to person, place, and time [Weakness] : right rotation weakness [Sclera] : the sclera and conjunctiva were normal [Outer Ear] : the ears and nose were normal in appearance [Neck Appearance] : the appearance of the neck was normal [] : no respiratory distress [Abnormal Walk] : normal gait [Skin Color & Pigmentation] : normal skin color and pigmentation [FreeTextEntry1] : Midline Back [FreeTextEntry6] : sutures removed [FreeTextEntry8] : lower extremity weakness, imbalance

## 2020-02-27 ENCOUNTER — APPOINTMENT (OUTPATIENT)
Dept: FAMILY MEDICINE | Facility: CLINIC | Age: 50
End: 2020-02-27

## 2020-06-01 ENCOUNTER — APPOINTMENT (OUTPATIENT)
Dept: NEUROSURGERY | Facility: CLINIC | Age: 50
End: 2020-06-01

## 2020-07-13 ENCOUNTER — APPOINTMENT (OUTPATIENT)
Dept: NEUROSURGERY | Facility: CLINIC | Age: 50
End: 2020-07-13
Payer: COMMERCIAL

## 2020-07-13 VITALS
DIASTOLIC BLOOD PRESSURE: 103 MMHG | OXYGEN SATURATION: 98 % | HEIGHT: 74 IN | RESPIRATION RATE: 18 BRPM | HEART RATE: 88 BPM | SYSTOLIC BLOOD PRESSURE: 203 MMHG | WEIGHT: 315 LBS | BODY MASS INDEX: 40.43 KG/M2 | TEMPERATURE: 98.1 F

## 2020-07-13 DIAGNOSIS — Z09 ENCOUNTER FOR FOLLOW-UP EXAMINATION AFTER COMPLETED TREATMENT FOR CONDITIONS OTHER THAN MALIGNANT NEOPLASM: ICD-10-CM

## 2020-07-13 DIAGNOSIS — R26.89 OTHER ABNORMALITIES OF GAIT AND MOBILITY: ICD-10-CM

## 2020-07-13 DIAGNOSIS — R20.0 ANESTHESIA OF SKIN: ICD-10-CM

## 2020-07-13 DIAGNOSIS — Z98.890 OTHER SPECIFIED POSTPROCEDURAL STATES: ICD-10-CM

## 2020-07-13 DIAGNOSIS — R20.2 ANESTHESIA OF SKIN: ICD-10-CM

## 2020-07-13 DIAGNOSIS — R29.898 OTHER SYMPTOMS AND SIGNS INVOLVING THE MUSCULOSKELETAL SYSTEM: ICD-10-CM

## 2020-07-13 PROCEDURE — 99215 OFFICE O/P EST HI 40 MIN: CPT

## 2020-07-13 RX ORDER — AMLODIPINE BESYLATE 5 MG/1
TABLET ORAL
Refills: 0 | Status: DISCONTINUED | COMMUNITY
End: 2020-07-13

## 2020-07-13 RX ORDER — LOSARTAN POTASSIUM 50 MG/1
50 TABLET, FILM COATED ORAL DAILY
Qty: 10 | Refills: 0 | Status: DISCONTINUED | COMMUNITY
Start: 2020-02-20 | End: 2020-07-13

## 2020-09-20 NOTE — REASON FOR VISIT
[Follow-Up: _____] : a [unfilled] follow-up visit [FreeTextEntry1] : \par TODAY'S VISIT:\par Returns to evaluate progress. \par \par PREVIOUS OFFICE VISIT 7/13/2020:\par Returns to the office today for follow up.\par He has scheduled appointments previously and cancelled. \par He reports reported imbalance and recent fall due to imbalance.\par He uses a cane for assistance with ambulation. \par He has not done physical therapy. He also reports residual numbness in bilateral lower extremities.\par Denies bowel/bladder incontinence.\par He has not returned to work due to imbalance and residual numbness.\par He has been noncompliant with prior plan of care. He did not follow up with PCP for further management of HTN. He has not been taking BP meds.\par At today's visit, his SBP is over 200.\par \par PREVIOUS OFFICE VISIT 2/2020:\par Reports he is doing well.\par Denies any signs of postop wound infection which could include but is not limited to redness/swelling/purulent drainage\par Denies CP/SOB/unilateral leg edema. Denies headaches, nausea, vomiting, dizziness, weakness. \par BP elevated at today's visit. Denies s/s stroke. He is asymptomatic.\par He is slowly introducing preop activities\par He uses a walker for assistance with ambulation as he continues to have bilateral lower extremity weakness and imbalance. Denies falls. Denies urinary incontinence. Denies numbness/tingling of lower extremities.\par

## 2020-09-20 NOTE — HISTORY OF PRESENT ILLNESS
[FreeTextEntry1] : 49 year old man with past medical history who presented to [facility] with progressively worsening bilateral leg weakness, numbness and paresthesias after a recent fall.\par \par Spinal imaging demonstrated severe thoracic spinal stenosis at T11 and T12 resulting in spinal cord compression att hose levels. 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

## 2020-09-21 ENCOUNTER — APPOINTMENT (OUTPATIENT)
Dept: NEUROSURGERY | Facility: CLINIC | Age: 50
End: 2020-09-21

## 2020-10-19 NOTE — PHYSICAL EXAM
[General Appearance - In No Acute Distress] : in no acute distress [General Appearance - Alert] : alert [Well-Healed] : well-healed [No Drainage] : without drainage [Normal Skin] : normal [Oriented To Time, Place, And Person] : oriented to person, place, and time [No Visual Abnormalities] : no visible abnormailities [No Tenderness to Palpation] : no spine tenderness on palpation [Sclera] : the sclera and conjunctiva were normal [Outer Ear] : the ears and nose were normal in appearance [] : no respiratory distress [Neck Appearance] : the appearance of the neck was normal [Abnormal Walk] : normal gait [Skin Color & Pigmentation] : normal skin color and pigmentation [FreeTextEntry1] : Midline Back [FreeTextEntry8] : lower extremity weakness, imbalance

## 2020-10-19 NOTE — REASON FOR VISIT
[FreeTextEntry1] : \par TODAY'S VISIT:\sami Returns to the office today for follow up.\par He has scheduled appointments previously and cancelled. \par He reports reported imbalance and recent fall due to imbalance.\par He uses a cane for assistance with ambulation. \par He has not done physical therapy. He also reports residual numbness in bilateral lower extremities.\par Denies bowel/bladder incontinence.\par He has not returned to work due to imbalance and residual numbness.\par He has been noncompliant with prior plan of care. He did not follow up with PCP for further management of HTN. He has not been taking BP meds.\par At today's visit, his SBP is over 200.\par \par PREVIOUS OFFICE VISIT 2/2020:\par Reports he is doing well.\par Denies any signs of postop wound infection which could include but is not limited to redness/swelling/purulent drainage\par Denies CP/SOB/unilateral leg edema. Denies headaches, nausea, vomiting, dizziness, weakness. \par BP elevated at today's visit. Denies s/s stroke. He is asymptomatic.\par He is slowly introducing preop activities\par He uses a walker for assistance with ambulation as he continues to have bilateral lower extremity weakness and imbalance. Denies falls. Denies urinary incontinence. Denies numbness/tingling of lower extremities.\par  [de-identified] : \par

## 2020-10-19 NOTE — ASSESSMENT
[FreeTextEntry1] : He has not yet returned to work due to imbalance - recommend evaluation by physical therapy and supervised physical therapy regimen to determine degree of disability.\par No Bend/Lift/Twist\par Gradually increase activities as tolerated\par Begin physical therapy for muscular strengthening, balance and coordination and gait training.\par Notify MD or report to ED for worsening neurological s/s, including but not limited to weakness, incontinence gait disturbance.\par Return to the office in 3 months to evaluate progress.\par \par At today's visit, Mr. Dickson's SBP was over 200 after being checked multiple times. He denies s/s stroke. It was recommended he report to ED for further evaluation and he refused recommendation. He states he will see a PCP close to his home in the [place].\par \par Patient verbalizes agreement and understanding with plan of care. \par \par

## 2020-10-19 NOTE — HISTORY OF PRESENT ILLNESS
[FreeTextEntry1] : 49 year old man with past medical history who presented to [facility] with progressively worsening bilateral leg weakness, numbness and paresthesias after a recent fall.\par \par Spinal imaging demonstrated severe thoracic spinal stenosis at T11 and T12 resulting in spinal cord compression att hose levels. 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

## 2021-04-11 NOTE — PROVIDER CONTACT NOTE (OTHER) - SITUATION
BP lowered after Labetalol 10mg IVP. BP now elevated again, 179/100, HR 87. Pt resting comfortably, no complaints. Pt asymptomatic. ,DirectAddress_Unknown

## 2022-07-17 NOTE — PROGRESS NOTE ADULT - PROBLEM SELECTOR PLAN 2
Patient received upon changing shift, noted unresponsive to painful stimuli and hypoxic. Notified Dr. Underwood. BP is elevated and started the patient on Losartan.  BP is better controlled and DC PRN labetolol and continue hydralazine avoid rapid drop in BP

## 2022-11-17 NOTE — PHYSICAL THERAPY INITIAL EVALUATION ADULT - ASR EQUIP NEEDS DISCH PT EVAL
INR today is 2.58.  INR done unexpectedly at appointment today.  No call made since INR done 2 days ago.    rolling walker (5 inch wheels)

## 2024-02-29 NOTE — PRE-OP CHECKLIST - NS PREOP CHK HIBICLENS NA
CC:  Jg Littlejohn is here today for Office Visit and Follow-up (6 month follow up)    Medications: medications verified, no change  Added preferred pharmacy  Denies  known Latex allergy or symptoms of Latex sensitivity.  All allergies and medications reviewed.  Patient would like communication of their results via:    [employer]    Cell Phone:   Telephone Information:   Mobile [phone]     Okay to leave a message containing results? Yes      Rooming documentation of social history includes tobacco screening only.   #1:

## 2024-10-23 NOTE — PHYSICAL THERAPY INITIAL EVALUATION ADULT - ADL SKILLS, REHAB EVAL
10/23/24                            Luis Manuel Franco  [address] [address]    To Whom It May Concern:    This is to certify Luis Manuel Franco was evaluated with Arelis Ontiveros MD on 10/23/24 and can return to school on 10/28/24 with no PE for that week..                  Electronically signed by:  Arelis Ontiveros MD  [facility] [place] [address] [address]  Dept Phone: [phone]           independent